# Patient Record
Sex: FEMALE | Race: ASIAN | NOT HISPANIC OR LATINO | Employment: STUDENT | URBAN - METROPOLITAN AREA
[De-identification: names, ages, dates, MRNs, and addresses within clinical notes are randomized per-mention and may not be internally consistent; named-entity substitution may affect disease eponyms.]

---
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
## 2022-01-09 ENCOUNTER — TELEPHONE (OUTPATIENT)
Dept: FAMILY MEDICINE | Facility: CLINIC | Age: 22
End: 2022-01-09

## 2022-01-10 NOTE — TELEPHONE ENCOUNTER
Left message #1 at [phone]. Postponing task out to a week and will try again. If patient returns call back, please help patient schedule an appointment per message below. Thanks!

## 2022-01-10 NOTE — TELEPHONE ENCOUNTER
She needs an appt to follow up on her depression and sleep.  I'm not sure if Dr. Rader is her PCP?  Or me?    Virtual visit OK

## 2022-01-17 NOTE — TELEPHONE ENCOUNTER
Left message #2 at [phone]. Sending letter out and postponing task out to 2 weeks and will try again if an appointment hasn't been made. If patient returns call back, please help patient schedule an appointment per message below. Thanks!

## 2022-02-04 NOTE — TELEPHONE ENCOUNTER
Left message #3 at [phone]. If patient returns call back, please help patient schedule an appointment per message below. Thanks! We have made several attempts to contact patient by phone and letter to schedule an appointment. Unfortunately, our calls have not been returned and we were unable to schedule. At this time, we will no longer make an attempt to schedule this appointment. Completing task.

## 2022-02-11 ENCOUNTER — OFFICE VISIT (OUTPATIENT)
Dept: FAMILY MEDICINE | Facility: CLINIC | Age: 22
End: 2022-02-11
Payer: COMMERCIAL

## 2022-02-11 VITALS
OXYGEN SATURATION: 98 % | HEART RATE: 94 BPM | TEMPERATURE: 98.8 F | DIASTOLIC BLOOD PRESSURE: 60 MMHG | RESPIRATION RATE: 20 BRPM | BODY MASS INDEX: 19.63 KG/M2 | HEIGHT: 60 IN | SYSTOLIC BLOOD PRESSURE: 110 MMHG | WEIGHT: 100 LBS

## 2022-02-11 DIAGNOSIS — N92.6 MISSED PERIOD: ICD-10-CM

## 2022-02-11 DIAGNOSIS — Z34.90 PREGNANCY, UNSPECIFIED GESTATIONAL AGE: Primary | ICD-10-CM

## 2022-02-11 LAB — HCG UR QL: POSITIVE

## 2022-02-11 PROCEDURE — 99214 OFFICE O/P EST MOD 30 MIN: CPT | Performed by: FAMILY MEDICINE

## 2022-02-11 PROCEDURE — 81025 URINE PREGNANCY TEST: CPT | Performed by: FAMILY MEDICINE

## 2022-02-11 RX ORDER — ONDANSETRON 4 MG/1
4 TABLET, FILM COATED ORAL EVERY 12 HOURS PRN
Qty: 30 TABLET | Refills: 0 | Status: SHIPPED | OUTPATIENT
Start: 2022-02-11 | End: 2022-03-04

## 2022-02-11 ASSESSMENT — MIFFLIN-ST. JEOR: SCORE: 1145.72

## 2022-02-14 ENCOUNTER — OFFICE VISIT (OUTPATIENT)
Dept: FAMILY MEDICINE | Facility: CLINIC | Age: 22
End: 2022-02-14
Payer: COMMERCIAL

## 2022-02-14 ENCOUNTER — ALLIED HEALTH/NURSE VISIT (OUTPATIENT)
Dept: NURSING | Facility: CLINIC | Age: 22
End: 2022-02-14

## 2022-02-14 VITALS
HEART RATE: 76 BPM | DIASTOLIC BLOOD PRESSURE: 52 MMHG | BODY MASS INDEX: 20.27 KG/M2 | RESPIRATION RATE: 16 BRPM | HEIGHT: 60 IN | SYSTOLIC BLOOD PRESSURE: 96 MMHG | OXYGEN SATURATION: 99 % | TEMPERATURE: 98 F | WEIGHT: 103.25 LBS

## 2022-02-14 DIAGNOSIS — F33.1 MODERATE EPISODE OF RECURRENT MAJOR DEPRESSIVE DISORDER (H): ICD-10-CM

## 2022-02-14 DIAGNOSIS — Z65.8 PSYCHOSOCIAL STRESSORS: ICD-10-CM

## 2022-02-14 DIAGNOSIS — Z60.3 IMPAIRED ABILITY TO USE COMMUNITY RESOURCES DUE TO LANGUAGE BARRIER: ICD-10-CM

## 2022-02-14 DIAGNOSIS — Z64.0 UNWANTED PREGNANCY WITH PLANS FOR TERMINATION: ICD-10-CM

## 2022-02-14 DIAGNOSIS — Z71.89 COMPLEX CARE COORDINATION: Primary | ICD-10-CM

## 2022-02-14 DIAGNOSIS — Z64.0 UNWANTED PREGNANCY WITH PLANS FOR TERMINATION: Primary | ICD-10-CM

## 2022-02-14 DIAGNOSIS — Z71.89 COMPLEX CARE COORDINATION: ICD-10-CM

## 2022-02-14 DIAGNOSIS — Z34.90 PREGNANCY, UNSPECIFIED GESTATIONAL AGE: Primary | ICD-10-CM

## 2022-02-14 PROCEDURE — 99207 PR NO CHARGE LOS: CPT | Performed by: FAMILY MEDICINE

## 2022-02-14 PROCEDURE — 99213 OFFICE O/P EST LOW 20 MIN: CPT | Performed by: FAMILY MEDICINE

## 2022-02-14 SDOH — ECONOMIC STABILITY: TRANSPORTATION INSECURITY
IN THE PAST 12 MONTHS, HAS LACK OF TRANSPORTATION KEPT YOU FROM MEETINGS, WORK, OR FROM GETTING THINGS NEEDED FOR DAILY LIVING?: NO

## 2022-02-14 SDOH — SOCIAL STABILITY - SOCIAL INSECURITY: ACCULTURATION DIFFICULTY: Z60.3

## 2022-02-14 SDOH — ECONOMIC STABILITY: INCOME INSECURITY: IN THE LAST 12 MONTHS, WAS THERE A TIME WHEN YOU WERE NOT ABLE TO PAY THE MORTGAGE OR RENT ON TIME?: NO

## 2022-02-14 SDOH — ECONOMIC STABILITY: TRANSPORTATION INSECURITY
IN THE PAST 12 MONTHS, HAS THE LACK OF TRANSPORTATION KEPT YOU FROM MEDICAL APPOINTMENTS OR FROM GETTING MEDICATIONS?: NO

## 2022-02-14 ASSESSMENT — SOCIAL DETERMINANTS OF HEALTH (SDOH)
ARE YOU MARRIED, WIDOWED, DIVORCED, SEPARATED, NEVER MARRIED, OR LIVING WITH A PARTNER?: LIVING WITH PARTNER
WITHIN THE LAST YEAR, HAVE YOU BEEN AFRAID OF YOUR PARTNER OR EX-PARTNER?: NO
DO YOU BELONG TO ANY CLUBS OR ORGANIZATIONS SUCH AS CHURCH GROUPS UNIONS, FRATERNAL OR ATHLETIC GROUPS, OR SCHOOL GROUPS?: NO
IN A TYPICAL WEEK, HOW MANY TIMES DO YOU TALK ON THE PHONE WITH FAMILY, FRIENDS, OR NEIGHBORS?: MORE THAN THREE TIMES A WEEK
HOW OFTEN DO YOU ATTEND CHURCH OR RELIGIOUS SERVICES?: NEVER
HOW OFTEN DO YOU GET TOGETHER WITH FRIENDS OR RELATIVES?: MORE THAN THREE TIMES A WEEK
HOW HARD IS IT FOR YOU TO PAY FOR THE VERY BASICS LIKE FOOD, HOUSING, MEDICAL CARE, AND HEATING?: SOMEWHAT HARD
WITHIN THE LAST YEAR, HAVE YOU BEEN HUMILIATED OR EMOTIONALLY ABUSED IN OTHER WAYS BY YOUR PARTNER OR EX-PARTNER?: NO
HOW OFTEN DO YOU ATTENT MEETINGS OF THE CLUB OR ORGANIZATION YOU BELONG TO?: NEVER
WITHIN THE LAST YEAR, HAVE YOU BEEN KICKED, HIT, SLAPPED, OR OTHERWISE PHYSICALLY HURT BY YOUR PARTNER OR EX-PARTNER?: NO
WITHIN THE LAST YEAR, HAVE TO BEEN RAPED OR FORCED TO HAVE ANY KIND OF SEXUAL ACTIVITY BY YOUR PARTNER OR EX-PARTNER?: NO

## 2022-02-14 ASSESSMENT — LIFESTYLE VARIABLES
HOW OFTEN DO YOU HAVE SIX OR MORE DRINKS ON ONE OCCASION: NEVER
HOW OFTEN DO YOU HAVE A DRINK CONTAINING ALCOHOL: NEVER

## 2022-02-14 ASSESSMENT — ACTIVITIES OF DAILY LIVING (ADL): DEPENDENT_IADLS:: INDEPENDENT

## 2022-02-14 ASSESSMENT — MIFFLIN-ST. JEOR: SCORE: 1160.48

## 2022-02-14 NOTE — PROGRESS NOTES
"SUBJECTIVE  Smu Paw is a 22 year old female here for:    Chief Complaint   Patient presents with     Follow Up     Pregnancy      Seen 3 days ago for pregnancy confirmation  First pregnancy  LMP unsure, but sometime in November  Unplanned pregnancy and she is considering termination  She discussed over the weekend with her boyfriend  Her sister does want her to continue the pregnancy, but she told her \"it is your body and your decision\"  Patient does not feel ready for a pregnancy at this time and desires termination  Concerned about waiting- she would like to do the termination as soon as possible because \"the longer I stay pregnant, the harder it is\"  Her family does not get along with her boyfriend- her mom does not approve of her relationship with her boyfriend  Patient is accompanied by her boyfriend, Art BLAKE  See HPI    Reviewed Past Medical History, Medications, Family History and Social History in Epic and up to date with no new changes.    OBJECTIVE  BP 96/52 (BP Location: Left arm, Patient Position: Sitting, Cuff Size: Adult Regular)   Pulse 76   Temp 98  F (36.7  C) (Oral)   Resp 16   Ht 1.525 m (5' 0.04\")   Wt 46.8 kg (103 lb 4 oz)   LMP 01/17/2022 (Approximate)   SpO2 99%   BMI 20.14 kg/m       General: Cooperative, pleasant, in no acute distress    ASSESSMENT/PLAN:   Smu was seen today for follow up.    Diagnoses and all orders for this visit:    Pregnancy, unspecified gestational age   Unwanted pregnancy with plans for termination: Discussed with patient with her boyfriend present in the room, as well as without him in the room. Patient does not feel ready for a pregnancy and is interested in termination resources. Unsure LMP- but maybe in November, placing her around 8 weeks gestation. She denies any coercion/safety issues with her relationship. She and her boyfriend are requesting assistance with navigating termination resources due to multiple barriers to care, including language " barrier. Care coordination referral DORA Interiano, assisted with [facility] with patient and her boyfriend- see note for details.   -     Care Coordination Referral    Complex care coordination  -     Care Coordination Referral    Impaired ability to use community resources due to language barrier  -     Care Coordination Referral    Psychosocial stressors  -     Care Coordination Referral    25 minutes spent on the date of the encounter doing chart review, history and exam, documentation and further activities as noted above      Options for treatment and follow-up care were reviewed with the patient. Smu Paw and/or guardian was engaged and actively involved in the decision making process. Smu Paw and/or guardian verbalized understanding of the options discussed and was satisfied with the final plan.      Michelle Blake MD

## 2022-02-14 NOTE — LETTER
[facility] CARE COORDINATION  [address]  [place] [address]  February 14, 2022    Smu Paw  [address]  [place] [address]      Dear Param,    I am a clinic care coordinator who works with Adrian Rader MD at the [facility]. I wanted to thank you for spending the time to talk with me.  Below is a description of clinic care coordination and how I can further assist you.      The clinic care coordination team is made up of a registered nurse,  and community health worker who understand the health care system. The goal of clinic care coordination is to help you manage your health and improve access to the health care system in the most efficient manner. The team can assist you in meeting your health care goals by providing education, coordinating services, strengthening the communication among your providers and supporting you with any resource needs.    Please feel free to contact the Community Health Worker at [phone] with any questions or concerns. We are focused on providing you with the highest-quality healthcare experience possible and that all starts with you.     Sincerely,     DWIGHT Roche, LSW  Social Work Care Coordinator  [facility]    Enclosed: I have enclosed a copy of the Patient Centered Plan of Care. This has helpful information and goals that we have talked about. Please keep this in an easy to access place to use as needed.

## 2022-02-14 NOTE — PROGRESS NOTES
Clinic Care Coordination Contact    Visit Note:     Present for today's assessment is pt, pt's boyfriend (Art Jaramillo), and CCC SW.     Param Polanco is a 21yo female who is approximately 8 weeks pregnant. She was referred for an initial CCC assessment and pregnancy resources consult. This is her first pregnancy, and she would like to make plans for termination.    1. Complex care coordination, impaired ability to use community resources due to language barrier  CCC assessment completed by SW today. Pt will be enrolled and assigned a CHW.     2. Psychosocial stressors, moderate episode of recurrent major depressive disorder  She is tearful throughout our call/conversation with [facility] today. Mixed emotions about a variety of factors, and shares that she is anxious about the thought of a procedural  vs pill form. Encouraged her to ask questions at her initial phone visit with the [facility] doctor tomorrow and will have Dr. Blake f/u with her by phone at some point this week for additional support.     3. Unwanted pregnancy with plans for termination  Approximately 8 weeks along. Boyfriend/FOB Art Jaramillo is involved and supportive. She does wish to pursue plans for termination. [facility] today. Brief medical history provided, options were reviewed. Took notes which were generated into letters tab and printed for her to take home. Several appointments scheduled for her to follow up with them to move forward with terminating the pregnancy. It was noted that she will not be eligible for the pill form of termination on the appointment timeline laid out below. Unfortunately this is the soonest available [facility] can get her in.     2022 at 9:30am - initial phone call with doctor    2022 at 11:30am - pre-visit phone call with doctor     2022 at 12:15pm - termination appointment in-person at [facility]     Plan:  1.) See goals.  2.) Begin scheduled outreach.    Referral Information:  Referral Source: PCP  Primary Diagnosis: Psychosocial    Chief Complaint   Patient presents with     Clinic Care Coordination - Initial     Clinic Care Coordination - Face To Face      Universal Utilization:   Clinic Utilization  Difficulty keeping appointments:: No  Compliance Concerns: No  No-Show Concerns: No  No PCP office visit in Past Year: No  Utilization    Hospital Admissions  0             ED Visits  0             No Show Count (past year)  3                Current as of: 2/14/2022  1:39 AM            Clinical Concerns:  Current Medical Concerns: See Problem List  Current Behavioral Concerns: See Problem List    Patient Active Problem List   Diagnosis     Irregular menses     Moderate episode of recurrent major depressive disorder (H)     Insomnia, unspecified type     Other fatigue     Education Provided to patient: Role of CCC   Pain  Pain (GOAL):: No  Health Maintenance Reviewed: Due/Overdue  Clinical Pathway: None    Medication Management:  Medication review status: Medications reviewed and no changes reported per patient.           Functional Status:  Dependent ADLs:: Independent  Dependent IADLs:: Independent  Bed or wheelchair confined:: No  Mobility Status: Independent  Fallen 2 or more times in the past year?: No  Any fall with injury in the past year?: No    Living Situation:  Current living arrangement:: I live in a private home with family  Type of residence:: Private home - no stairs    Social Determinants of Health     Tobacco Use: High Risk     Smoking Tobacco Use: Never Smoker     Smokeless Tobacco Use: Current User   Alcohol Use: Not At Risk     Frequency of Alcohol Consumption: Never     Average Number of Drinks: Not on file     Frequency of Binge Drinking: Never   Financial Resource Strain: Medium Risk     Difficulty of Paying Living Expenses: Somewhat hard   Food Insecurity: No Food  Insecurity     Worried About Running Out of Food in the Last Year: Never true     Ran Out of Food in the Last Year: Never true   Transportation Needs: No Transportation Needs     Lack of Transportation (Medical): No     Lack of Transportation (Non-Medical): No   Physical Activity: Insufficiently Active     Days of Exercise per Week: 1 day     Minutes of Exercise per Session: 10 min   Stress: Stress Concern Present     Feeling of Stress : To some extent   Social Connections: Moderately Isolated     Frequency of Communication with Friends and Family: More than three times a week     Frequency of Social Gatherings with Friends and Family: More than three times a week     Attends Zoroastrian Services: Never     Active Member of Clubs or Organizations: No     Attends Club or Organization Meetings: Never     Marital Status: Living with partner   Intimate Partner Violence: Not At Risk     Fear of Current or Ex-Partner: No     Emotionally Abused: No     Physically Abused: No     Sexually Abused: No   Depression: At risk     PHQ-2 Score: 6   Housing Stability: Low Risk      Unable to Pay for Housing in the Last Year: No     Number of Places Lived in the Last Year: 2     Unstable Housing in the Last Year: No     Lifestyle & Psychosocial Needs:  Diet:: Regular  Inadequate nutrition (GOAL):: No  Tube Feeding: No  Inadequate activity/exercise (GOAL):: No  Significant changes in sleep pattern (GOAL): No  Transportation means:: Medical transport,Regular car,Other  Zoroastrian or spiritual beliefs that impact treatment:: No  Mental health DX:: No  Mental health management concern (GOAL):: No  Chemical Dependency Status: No Current Concerns  Informal Support system:: Significant other,Family,Friends      Resources and Interventions:  Community Resources: Transportation Services,Financial/Insurance  Supplies used at home:: None  Equipment Currently Used at Home: none  Employment Status: unemployed    Advance Care  Plan/Directive:  Advanced Care Plans/Directives on file:: No  Advanced Care Plan/Directive Status: Declined Further Information    Referrals Placed: Specialty Providers ([facility])     Outreach Frequency: monthly    Goals        1. [facility] (pt-stated)       Goal statement: I will attend my upcoming appointments with [facility] as scheduled.      Date goal set: 2/14/22  Barriers: English as a second language  Strengths: Willing to accept support  Date to achieve by: 4/14/22  Patient expressed understanding of goal: Yes     Action steps to achieve this goal:  1.  I will answer the phone on 2/15/22 at 9:30am when [facility] calls for my initial phone visit.  2.  I will answer the phone on 3/4/22 at 11:30am when [facility] calls for my pre-visit phone call.  3.  I will attend my in-person appointment with [facility] on 3/7/22 at 12:15pm.  4.  I will communicate with CHW at outreach and request assistance with appointment/transportation coordination as needed.   5.  I will reach out to [facility] at [phone] with questions or concerns.    [facility]  Phone: [phone]  [address] [place] [address]         2. [facility] (pt-stated)       Goal statement: I will attend all scheduled follow up appointments with Dr. Blake throughout the next 60 days.     Date goal set: 2/14/22  Barriers: English as a second language  Strengths: Willing to accept support  Date to achieve by: 4/14/22  Patient expressed understanding of goal: Yes     Action steps to achieve this goal:  1.  I will attend my next f/u appointment with Dr. Blake.  2.  I will communicate with CHW at outreach and request assistance with appointment/transportation coordination as needed.   3.  I will reach out to [facility] at [phone] with questions or concerns.

## 2022-02-14 NOTE — LETTER
[facility]  Patient Centered Plan of Care  About Me:        Patient Name:  Param Polanco    YOB: 2000  Age:         22 year old   Margy MRN:    [id number] Telephone Information:  Home Phone [phone]   Mobile [phone]       Address:  [address]  [place] [address] Email address:  No e-mail address on record      Emergency Contact(s)    Name Relationship Lgl Grd Work Phone Home Phone Mobile Phone   1. LIS LLANES Mother   [phone]    2. OSMAN FOSTER Father   [phone]            Primary language:  Sara     needed? Yes   [facility]:  [phone] op. 1  Other communication barriers:English as a second language; Lack of coping    Preferred Method of Communication:     Current living arrangement: I live in a private home with family    Mobility Status/ Medical Equipment: Independent        Health Maintenance  Health Maintenance Reviewed: Due/Overdue      My Access Plan  Medical Emergency 911   Primary Clinic Line [facility] [phone]   24 Hour Appointment Line [phone] or  [phone]) (toll-free)   24 Hour Nurse Line [phone] (toll-free)   Preferred Urgent Care [facility] [phone]     [facility]  [phone]     Preferred Pharmacy [facility] - [place], [place] - [address]     Behavioral Health Crisis Line The National Suicide Prevention Lifeline at 1-928.157.7319 or 911             My Care Team Members  Patient Care Team       Relationship Specialty Notifications Start End    Adrian Rader MD PCP - General Family Practice  9/28/16     Phone: [phone] Fax: [phone]         [address] [place] [address]    Suma Fleming PA-C Assigned PCP   10/14/21     Phone: [phone] Fax: [phone]         [address] [place] [address]    Simran Silvestre LSW Lead Care Coordinator Social  Worker - Clinical Admissions 2/14/22             My Care Plans  Self Management and Treatment Plan  Goals and (Comments)  Goals        General     1. [facility] ([facility])      Notes - Note edited  2/14/2022  1:32 PM by Simran Silvestre LSW     Goal statement: I will attend my upcoming appointments with [facility] as scheduled.      Date goal set: 2/14/22  Barriers: English as a second language  Strengths: Willing to accept support  Date to achieve by: 4/14/22  Patient expressed understanding of goal: Yes     Action steps to achieve this goal:  1.  I will answer the phone on 2/15/22 at 9:30am when [facility] calls for my initial phone visit.  2.  I will answer the phone on 3/4/22 at 11:30am when [facility] calls for my pre-visit phone call.  3.  I will attend my in-person appointment with [facility] on 3/7/22 at 12:15pm.  4.  I will communicate with CHW at outreach and request assistance with appointment/transportation coordination as needed.   5.  I will reach out to  Simran at [phone] with questions or concerns.    [facility]  Phone: [phone]  [address] [place] [address]       2. [facility] (pt-stated)      Notes - Note edited  2/14/2022  1:32 PM by Simran Silvestre LSW     Goal statement: I will attend all scheduled follow up appointments with Dr. Blake throughout the next 60 days.     Date goal set: 2/14/22  Barriers: English as a second language  Strengths: Willing to accept support  Date to achieve by: 4/14/22  Patient expressed understanding of goal: Yes     Action steps to achieve this goal:  1.  I will attend my next f/u appointment with Dr. Blake.  2.  I will communicate with CHW at outreach and request assistance with appointment/transportation coordination as needed.   3.  I will reach out to DORA Khalil at [phone] with questions or concerns.             Action Plans on File:                       Advance Care  Plans/Directives Type:   No data recorded    My Medical and Care Information  Problem List   Patient Active Problem List   Diagnosis     Irregular menses     Moderate episode of recurrent major depressive disorder (H)     Insomnia, unspecified type     Other fatigue      Current Medications and Allergies:  See printed Medication Report.    Care Coordination Start Date: 2/14/2022   Frequency of Care Coordination: monthly     Form Last Updated: 02/14/2022

## 2022-02-14 NOTE — LETTER
Appointment information:    Tuesday February 15th, 2022 at 9:30am - initial phone call with doctor    Friday March 4th, 2022 at 11:30am - pre-visit phone call with doctor     Monday March 7th, 2022 at 12:15pm - termination appointment in-person at [facility] location    [facility] information[facility]     [facility]  [address] [place] [address]    Phone: [phone]    Day of appointment instructions...    - Eat a light meal before going to your appointment  - Drink lots of water 1-2 days before your procedure  - Do not wear lotion, perfume, makeup, or jewelry to your appointment  - Bring your photo ID and insurance card  - Wear a mask  - Park in one of the open patient parking spots    Other helpful information to remember...    - Insurance will cover the cost of your visit and procedure regardless of the method you choose    - Your visit at the [facility] center will last about 4-6 hours, plan to be there for the entire day and do not make other plans or appointments    -  at [facility] is DWIGHT Roche, LSW. You can reach Simran via phone call or text at [phone] if you need anything or have questions about this process at any time. Simran works on Monday, Wednesday, and Friday. Leave a message if she doesn't answer and she will get back to you when able!

## 2022-02-15 ENCOUNTER — NURSE TRIAGE (OUTPATIENT)
Dept: NURSING | Facility: CLINIC | Age: 22
End: 2022-02-15
Payer: COMMERCIAL

## 2022-02-15 NOTE — TELEPHONE ENCOUNTER
Patient called to speak with PCP regarding  questions she has. Nurse unable to answer the questions and patient would like a call back from her PCP.     Writer routing message to PCP to address patient's questions.     Soo Carlson RN, [facility]  2/15/2022 12:45pm      Reason for Disposition    [1] Caller has NON-URGENT pregnancy question AND [2] triager unable to answer question    Protocols used: PREGNANCY [id number]

## 2022-02-18 NOTE — TELEPHONE ENCOUNTER
Rn call to patient with  ([phone])  -patient reports ordering pills for medical  through [email or web address]  -expecting medication to arrive 22  -RN walked through process/what to expect:    [email or web address]/[email or web address]--[email or web address]     Encouraged her to call [facility] at any time with questions or concerns [phone]    [facility] -RN advised keeping appointment for the time being -in case medications don't arrive/work    RN will call patient to check in at [phone] number on 22. Patient states any time that day at that number is good.     Follow-up call will include scheduling a lab pregnancy test/office visit with provider to confirm  success and discuss birthcontrol options.     RN spoke to provider and relayed this information.     Routing to provider for FYI and to Nurse Morton for follow-up    Carolyn Cantu RN on 2022 at 5:10 PM

## 2022-02-22 NOTE — TELEPHONE ENCOUNTER
"RN call to patient (with ) * was kept throughout call but patient tried to communicate directly with RN in english    Medication arrived as scheduled    Took first dose Saturday   -began having fever and chills    Second dose Sunday    *patient not clear on when bleeding started    Reports some heavy bleeding, cramping and passing of large clots of tissue  -still having bleeding but improving   - 2/22/22 less than 2/23/22 *both bleeding and cramping  -unable to tell RN how often pads are being changed, as she does not have access to menstrual hygiene products     Patient states that she is no longer living with sister -as sister \"hates her and is jealous of her\"  -now living with mother who knew of the pregnancy, not the termination    Reports mood is 'sort of depressed\" as boyfriend broke up with her.   RN inquired about suicidal ideation -patient states that sometimes she thinks she might not want to be alive/should she be alive  -patient denies any plans or actual thoughts of killing herself  -advised that current situation is temporary and emotions are running high, she has been through a lot of change and some sadness is normal and to be expected. Patient agrees    Patient is agreeable to visit with provider to follow-up and discuss birth control options  -RN scheduled and requested transportation    RN offered follow-up call later this week and patient states that would be helpful    -patient asked 2 questions: should she [facility] her [facility] (okay to [facility])  And can she shower/activity restrictions: no restrictions, patient can do whatever she feels up to doing.    RN will call patient again 2/24/22 to check in as planned with patient     Encouraged patient to call with any questions or concerns, patient confirms phone number for [facility]      "

## 2022-02-24 NOTE — TELEPHONE ENCOUNTER
"RN call to patient with     Spoke with patient at length   -bleeding has improved  -now like a period  -intermittent cramping (\"pain near bladder\")      How are you feeling today    Good.   Yes,     Not sleeping well  -thinking too much  -    "

## 2022-02-25 ENCOUNTER — PATIENT OUTREACH (OUTPATIENT)
Dept: CARE COORDINATION | Facility: CLINIC | Age: 22
End: 2022-02-25
Payer: COMMERCIAL

## 2022-02-25 DIAGNOSIS — Z60.3 IMPAIRED ABILITY TO USE COMMUNITY RESOURCES DUE TO LANGUAGE BARRIER: ICD-10-CM

## 2022-02-25 DIAGNOSIS — Z71.89 COMPLEX CARE COORDINATION: Primary | ICD-10-CM

## 2022-02-25 DIAGNOSIS — Z65.8 PSYCHOSOCIAL STRESSORS: ICD-10-CM

## 2022-02-25 SDOH — SOCIAL STABILITY - SOCIAL INSECURITY: ACCULTURATION DIFFICULTY: Z60.3

## 2022-02-25 NOTE — PROGRESS NOTES
Clinic Care Coordination Contact     Care Team Conversation - Provider to Provider     Discussed pt/pt's case with clinic RN Carolyn Cantu. Carolyn spoke with pt by phone several times this week to get an update on how she is doing post medical . Pt ordered medication through [email or web address]. Took first dose on 22 and second dose on 22. See nurse triage encounter dated 2/15/22 for detailed notes summarizing phone conversations.     There are a variety of psychosocial stressors/concerns that were identified while talking with Carolyn this week. Smu is coming to f/u with Dr. Blake in-person next Friday 3/4/21 at 10:40am. Current plan is to have her f/u with me same day following her visit with Dr. Blake.     Plan:  1.) See updated goals.  2.) Continue scheduled outreach.    Goals        [facility]. [facility])       Goal statement: I will attend my upcoming [facility] as scheduled.      Date goal set: 22  Barriers: English as a second language  Strengths: Willing to accept support  Date to achieve by: 22  Patient expressed understanding of goal: Yes     Action steps to achieve this goal:  1.  I will answer the phone on 2/15/22 at 9:30am when [facility] calls for my initial phone visit.  2.  I will answer the phone on 3/4/22 at 11:30am when [facility] calls for my pre-visit phone call.  3.  I will attend my in-person appointment with [facility] on 3/7/22 at 12:15pm.  4.  I will communicate with CHW at outreach and request assistance with appointment/transportation coordination as needed.   5.  I will reach out to DORA Khalil at [phone] with questions or concerns.    [facility]  Phone: [phone]  [address] [place] [id number]         2. [facility] (pt-stated)       Goal statement: I will attend all scheduled follow up appointments with Dr. Blake throughout the next 60 days.     Date goal set: 22  Barriers:  English as a second language  Strengths: Willing to accept support  Date to achieve by: 4/14/22  Patient expressed understanding of goal: Yes     Action steps to achieve this goal:  1.  I will attend my next f/u appointment with Dr. Blake on 3/4/22 at 10:40am and see DORA Khalil the same day.  2.  I will communicate with CHW at outreach and request assistance with appointment/transportation coordination as needed.   3.  I will reach out to DORA Khaill at [phone] with questions or concerns.

## 2022-03-04 ENCOUNTER — OFFICE VISIT (OUTPATIENT)
Dept: FAMILY MEDICINE | Facility: CLINIC | Age: 22
End: 2022-03-04
Payer: COMMERCIAL

## 2022-03-04 VITALS
HEIGHT: 60 IN | DIASTOLIC BLOOD PRESSURE: 58 MMHG | RESPIRATION RATE: 16 BRPM | TEMPERATURE: 97.8 F | SYSTOLIC BLOOD PRESSURE: 96 MMHG | HEART RATE: 76 BPM | WEIGHT: 102 LBS | OXYGEN SATURATION: 99 % | BODY MASS INDEX: 20.03 KG/M2

## 2022-03-04 DIAGNOSIS — F41.9 ANXIETY: ICD-10-CM

## 2022-03-04 DIAGNOSIS — R30.0 DYSURIA: ICD-10-CM

## 2022-03-04 DIAGNOSIS — Z30.42 DEPO-PROVERA CONTRACEPTIVE STATUS: ICD-10-CM

## 2022-03-04 DIAGNOSIS — Z98.890 HISTORY OF ELECTIVE ABORTION: ICD-10-CM

## 2022-03-04 DIAGNOSIS — F33.1 MODERATE EPISODE OF RECURRENT MAJOR DEPRESSIVE DISORDER (H): ICD-10-CM

## 2022-03-04 DIAGNOSIS — G47.00 INSOMNIA, UNSPECIFIED TYPE: Primary | ICD-10-CM

## 2022-03-04 DIAGNOSIS — Z11.3 SCREEN FOR STD (SEXUALLY TRANSMITTED DISEASE): ICD-10-CM

## 2022-03-04 LAB
ALBUMIN UR-MCNC: NEGATIVE MG/DL
APPEARANCE UR: CLEAR
BACTERIA #/AREA URNS HPF: ABNORMAL /HPF
BILIRUB UR QL STRIP: NEGATIVE
COLOR UR AUTO: YELLOW
GLUCOSE UR STRIP-MCNC: NEGATIVE MG/DL
HCG SERPL-ACNC: 297 MLU/ML (ref 0–4)
HGB BLD-MCNC: 13.2 G/DL (ref 11.7–15.7)
HGB UR QL STRIP: ABNORMAL
KETONES UR STRIP-MCNC: NEGATIVE MG/DL
LEUKOCYTE ESTERASE UR QL STRIP: ABNORMAL
NITRATE UR QL: NEGATIVE
PH UR STRIP: 6 [PH] (ref 5–7)
RBC #/AREA URNS AUTO: ABNORMAL /HPF
SP GR UR STRIP: <=1.005 (ref 1–1.03)
SQUAMOUS #/AREA URNS AUTO: ABNORMAL /LPF
UROBILINOGEN UR STRIP-ACNC: 0.2 E.U./DL
WBC #/AREA URNS AUTO: ABNORMAL /HPF

## 2022-03-04 PROCEDURE — 96372 THER/PROPH/DIAG INJ SC/IM: CPT | Performed by: FAMILY MEDICINE

## 2022-03-04 PROCEDURE — 87086 URINE CULTURE/COLONY COUNT: CPT | Performed by: FAMILY MEDICINE

## 2022-03-04 PROCEDURE — 87591 N.GONORRHOEAE DNA AMP PROB: CPT | Performed by: FAMILY MEDICINE

## 2022-03-04 PROCEDURE — 81001 URINALYSIS AUTO W/SCOPE: CPT | Performed by: FAMILY MEDICINE

## 2022-03-04 PROCEDURE — 85018 HEMOGLOBIN: CPT | Performed by: FAMILY MEDICINE

## 2022-03-04 PROCEDURE — 87491 CHLMYD TRACH DNA AMP PROBE: CPT | Performed by: FAMILY MEDICINE

## 2022-03-04 PROCEDURE — 84702 CHORIONIC GONADOTROPIN TEST: CPT | Performed by: FAMILY MEDICINE

## 2022-03-04 PROCEDURE — 36415 COLL VENOUS BLD VENIPUNCTURE: CPT | Performed by: FAMILY MEDICINE

## 2022-03-04 PROCEDURE — 87186 SC STD MICRODIL/AGAR DIL: CPT | Performed by: FAMILY MEDICINE

## 2022-03-04 PROCEDURE — 99214 OFFICE O/P EST MOD 30 MIN: CPT | Mod: 25 | Performed by: FAMILY MEDICINE

## 2022-03-04 RX ORDER — MEDROXYPROGESTERONE ACETATE 150 MG/ML
150 INJECTION, SUSPENSION INTRAMUSCULAR
Status: DISCONTINUED | OUTPATIENT
Start: 2022-03-04 | End: 2022-11-04

## 2022-03-04 RX ADMIN — MEDROXYPROGESTERONE ACETATE 150 MG: 150 INJECTION, SUSPENSION INTRAMUSCULAR at 11:23

## 2022-03-04 NOTE — PROGRESS NOTES
"SUBJECTIVE  Smu Collin is a 22 year old female here for:    Chief Complaint   Patient presents with     Follow Up     pregnancy termination and discuss birthcontrol option      Cannot remember exact day that she took pill for pregnancy termination  She took two pills on two separate days  ?maybe 2/20/22  She had about 4 days of bleeding- heavier bleeding the two middle days with large blood clots  Bleeding has since resolved   Denies any abdominal pain, fever/chills  She does report some dysuria about 2 days ago- feels like UTI- she had these previously    History of depression  Previously prescribed lexapro 10 mg daily but she reports she never tried this  She did therapy in high school and did find this useful- interested in trying therapy again  Worries a lot- has difficulty with sleep initiation for at least 2 years  Did not sleep much at all last night  Feels her mind is worrying about things  Once she falls asleep, she stays asleep  Reports she has thoughts about not wanting to be alive, but denies any plan or intent.   Living with her mother  Boyfriend- they broke up after termination but since then, he called her and they are back together- she denies any safety issues or abuse. Her mother does not approve of her relationship with him, so she has to hide this from her mother.    She is interested in birth control  Wants to try to gain weight    ROS  See HPI    Reviewed Past Medical History, Medications, Family History and Social History in Epic and up to date with no new changes.    OBJECTIVE  BP 96/58   Pulse 76   Temp 97.8  F (36.6  C) (Oral)   Resp 16   Ht 1.525 m (5' 0.04\")   Wt 46.3 kg (102 lb)   SpO2 99%   BMI 19.89 kg/m       General: Cooperative, pleasant, in no acute distress      LABS & IMAGES   Results for orders placed or performed in visit on 03/04/22   Hemoglobin     Status: Normal   Result Value Ref Range    Hemoglobin 13.2 11.7 - 15.7 g/dL   HCG quantitative pregnancy     Status: Abnormal "   Result Value Ref Range    hCG Quantitative 297 (H) 0 - 4 mlU/mL   UA Macro with Reflex to Micro and Culture - lab collect     Status: Abnormal    Specimen: Urine, NOS   Result Value Ref Range    Color Urine Yellow Colorless, Straw, Light Yellow, Yellow    Appearance Urine Clear Clear    Glucose Urine Negative Negative mg/dL    Bilirubin Urine Negative Negative    Ketones Urine Negative Negative mg/dL    Specific Gravity Urine <=1.005 1.005 - 1.030    Blood Urine Small (A) Negative    pH Urine 6.0 5.0 - 7.0    Protein Albumin Urine Negative Negative mg/dL    Urobilinogen Urine 0.2 0.2, 1.0 E.U./dL    Nitrite Urine Negative Negative    Leukocyte Esterase Urine Large (A) Negative   Urine Microscopic Exam     Status: Abnormal   Result Value Ref Range    Bacteria Urine Moderate (A) None Seen /HPF    RBC Urine 0-2 0-2 /HPF /HPF    WBC Urine 25-50 (A) 0-5 /HPF /HPF    Squamous Epithelials Urine Moderate (A) None Seen /LPF       ASSESSMENT/PLAN:   Smu was seen today for follow up.    Diagnoses and all orders for this visit:    Insomnia, unspecified type: Chronic issue- reviewed sleep hygiene. She reportedly tried two medications during high school but cannot recall name. Will trial unisom. I also reviewed connection between insomnia and stress/anxiety- she does report racing thoughts often make it difficult for her to fall alseep.   -     doxylamine (UNISOM) 25 MG TABS tablet; Take 0.5-1 tablets (12.5-25 mg) by mouth nightly as needed for sleep    Moderate episode of recurrent major depressive disorder (H)  Anxiety: Previously was prescribed lexapro but patient reporteldy never started this medication. Reviewed options for treatment- patient is amenable to starting therapy- she had a counselor or therapist at her school in high school and found this helpful.     History of elective : Took oral medication through [facility]- per patient history, she had significant bleeding and passing of blood clots x 4 days that  has resolved. Check serum beta HCG and hemoglobin today.  -     HCG quantitative pregnancy; Future  -     Hemoglobin; Future  -     Hemoglobin  -     HCG quantitative pregnancy    Depo-Provera contraceptive status: Did not obtain UPT since it would be expected to be positive in lieu of recent TAB. Depo given. She is considering LARC, such as nexplanon in the future.  -     medroxyPROGESTERone (DEPO-PROVERA) injection 150 mg    Dysuria: 2 day history of dysuria. Check UA/UC.  -     UA Macro with Reflex to Micro and Culture - lab collect; Future  -     UA Macro with Reflex to Micro and Culture - lab collect  -     Urine Microscopic Exam  -     Urine Culture    Screen for STD (sexually transmitted disease): Due to dysuria, screen for STI.  -     Neisseria gonorrhoeae PCR; Future  -     Chlamydia trachomatis PCR; Future  -     Neisseria gonorrhoeae PCR  -     Chlamydia trachomatis PCR        30 minutes spent on the date of the encounter doing chart review, history and exam, documentation and further activities as noted above    Return in about 2 weeks (around 3/18/2022) for insomnia.     Visit was completed along with Sara ascencio    Options for treatment and follow-up care were reviewed with the patient. Smu Paw and/or guardian was engaged and actively involved in the decision making process. Smu Paw and/or guardian verbalized understanding of the options discussed and was satisfied with the final plan.      Michelle Blake MD

## 2022-03-05 LAB
C TRACH DNA SPEC QL NAA+PROBE: NEGATIVE
N GONORRHOEA DNA SPEC QL NAA+PROBE: NEGATIVE

## 2022-03-06 LAB — BACTERIA UR CULT: ABNORMAL

## 2022-03-07 ENCOUNTER — TELEPHONE (OUTPATIENT)
Dept: FAMILY MEDICINE | Facility: CLINIC | Age: 22
End: 2022-03-07
Payer: COMMERCIAL

## 2022-03-07 NOTE — TELEPHONE ENCOUNTER
Left voicemail via phone  for patient to call clinic back at earliest convenience. If patient calls back, please relay provider message below about her UTI and that a prescription was sent in to the pharmacy for her.     Tiny YOST RN      ----- Message from Michelle Blake MD sent at 3/7/2022 11:58 AM CST -----  Attempted calling patient. No answer. Please attempt calling again to let her know    1. Her urine did show a UTI (bladder infection)- I have sent a prescription for antibiotic to her pharmacy- take 1 tablet twice daily for 5 days- to treat. Make sure she drinks a lot of water as well.    2. Her pregnancy hormone was low- this was as we would expect after a pregnancy termination (). I will recheck her blood test in 2 weeks at her clinic appointment.

## 2022-03-08 DIAGNOSIS — F33.1 MODERATE EPISODE OF RECURRENT MAJOR DEPRESSIVE DISORDER (H): Primary | ICD-10-CM

## 2022-03-08 NOTE — TELEPHONE ENCOUNTER
RN call to patient   -left  regarding prescription /UTI  -RN will call again 03/09/2022    Carolyn Cantu RN on 3/8/2022 at 5:49 PM         100

## 2022-03-10 ENCOUNTER — TELEPHONE (OUTPATIENT)
Dept: BEHAVIORAL HEALTH | Facility: CLINIC | Age: 22
End: 2022-03-10
Payer: COMMERCIAL

## 2022-03-10 NOTE — TELEPHONE ENCOUNTER
RN attempted to call patient 3 times. Twice with , once without. Numbers will not go through.     Behavioral health also attempted to call today without luck.    Carolyn Cantu RN on 3/10/2022 at 5:58 PM

## 2022-03-10 NOTE — TELEPHONE ENCOUNTER
Writer tried both the:   house phone: [phone], and  mobile phone: [phone], but both would not go through.    Writer was unable to leave  due to this.

## 2022-03-10 NOTE — TELEPHONE ENCOUNTER
Referring clinic RN attempted call to patient 3x -no answer, no voicemail      Carolyn Cantu RN on 3/10/2022 at 5:56 PM

## 2022-03-14 ENCOUNTER — VIRTUAL VISIT (OUTPATIENT)
Dept: PSYCHOLOGY | Facility: CLINIC | Age: 22
End: 2022-03-14
Payer: COMMERCIAL

## 2022-03-14 DIAGNOSIS — F32.A DEPRESSION, UNSPECIFIED DEPRESSION TYPE: Primary | ICD-10-CM

## 2022-03-14 PROCEDURE — 90834 PSYTX W PT 45 MINUTES: CPT | Mod: 95

## 2022-03-14 ASSESSMENT — ANXIETY QUESTIONNAIRES
6. BECOMING EASILY ANNOYED OR IRRITABLE: SEVERAL DAYS
7. FEELING AFRAID AS IF SOMETHING AWFUL MIGHT HAPPEN: NOT AT ALL
1. FEELING NERVOUS, ANXIOUS, OR ON EDGE: NOT AT ALL
5. BEING SO RESTLESS THAT IT IS HARD TO SIT STILL: SEVERAL DAYS
4. TROUBLE RELAXING: NOT AT ALL
2. NOT BEING ABLE TO STOP OR CONTROL WORRYING: NOT AT ALL
3. WORRYING TOO MUCH ABOUT DIFFERENT THINGS: NEARLY EVERY DAY
GAD7 TOTAL SCORE: 5

## 2022-03-14 ASSESSMENT — PATIENT HEALTH QUESTIONNAIRE - PHQ9: SUM OF ALL RESPONSES TO PHQ QUESTIONS 1-9: 13

## 2022-03-14 NOTE — Clinical Note
Pete Romero,  I am having difficulty with getting the correct DX in the note and for the plan- I got the closest I could find. Will these work?   Thanks,  Abigail

## 2022-03-14 NOTE — PROGRESS NOTES
"    [facility]   Mental Health & Addiction Services     Progress Note - Initial Visit    Patient  Name:  Param Polanco Date: 3/14/2022         Service Type: Individual     Visit Start Time: 10:18 AM  Visit End Time: 11:00 AM    Visit #: 1    Attendees: Client and     Service Modality:  Phone Visit:      Provider verified identity through the following two step process.  Patient provided:  Patient  and Patient address    The patient has been notified of the following:      \"We have found that certain health care needs can be provided without the need for a face to face visit.  This service lets us provide the care you need with a phone conversation.       I will have full access to your [facility] medical record during this entire phone call.   I will be taking notes for your medical record.      Since this is like an office visit, we will bill your insurance company for this service.       There are potential benefits and risks of telephone visits (e.g. limits to patient confidentiality) that differ from in-person visits.?Confidentiality still applies for telephone services, and nobody will record the visit.  It is important to be in a quiet, private space that is free of distractions (including cell phone or other devices) during the visit.??      If during the course of the call I believe a telephone visit is not appropriate, you will not be charged for this service\"     Consent has been obtained for this service by care team member: Yes        DATA:   Interactive Complexity: No   Crisis: No     Presenting Concerns/  Current Stressors:   Param was referred to therapy by her PCP for low mood, increased worrying, trouble falling asleep and thoughts that she would be better off dead. Param agrees with her PCP's assessment and is ambivalent about therapy at this time. She reports 2 past self interrupted suicide attempts and current denies any safety concerns and contracts for safety. "     ASSESSMENT:  Mental Status Assessment:  Appearance:   Telephone, unable to assess   Eye Contact:   Telephone, unable to assess   Psychomotor Behavior: Telephone, unable to assess   Attitude:   Guarded   Orientation:   All  Speech   Rate / Production: Normal/ Responsive   Volume:  Soft   Mood:    Depressed   Affect:    Telephone, unable to assess   Thought Content:  Clear   Thought Form:  Coherent  Logical   Insight:    Fair       Safety Issues and Plan for Safety and Risk Management:     Bath Suicide Severity Rating Scale (Lifetime/Recent)  Bath Suicide Severity Rating (Lifetime/Recent) 3/21/2022   1. Wish to be Dead (Lifetime) 1   Wish to be Dead Description (Lifetime) age 11/12   1. Wish to be Dead (Past 1 Month) 0   2. Non-Specific Active Suicidal Thoughts (Lifetime) 1   2. Non-Specific Active Suicidal Thoughts (Past 1 Month) 0   3. Active Suicidal Ideation with any Methods (Not Plan) Without Intent to Act (Lifetime) 1   Active Suicidal Ideation with any Methods (Not Plan) Description (Lifetime) hang herself   3. Active Suicidal Ideation with any Methods (Not Plan) Without Intent to Act (Past 1 Month) 0   4. Active Suicidal Ideation with Some Intent to Act, Without Specific Plan (Lifetime) 1   Active Suicidal Ideation with Some Intent to Act, Without Specific Plan Description (Lifetime) hang herself   4. Active Suicidal Ideation with Some Intent to Act, Without Specific Plan (Past 1 Month) 0   5. Active Suicidal Ideation with Specific Plan and Intent (Lifetime) 1   Active Suicidal Ideation with Specific Plan and Intent Description (Lifetime) hang herself   5. Active Suicidal Ideation with Specific Plan and Intent (Past 1 Month) 0   Most Severe Ideation Rating (Lifetime) 5   Duration (Lifetime) 4   Controllability (Lifetime) 5   Deterrents (Lifetime) 1   Reasons for Ideation (Lifetime) 3   Actual Attempt (Lifetime) 0   Actual Attempt (Past 3 Months) 0   Has subject engaged in non-suicidal  self-injurious behavior? (Lifetime) 0   Interrupted Attempts (Lifetime) 0   Interrupted Attempts (Past 3 Months) 0   Aborted or Self-Interrupted Attempt (Lifetime) 1   Total Number of Aborted or Self-Interrupted Attempts (Lifetime) 2   Aborted or Self-Interrupted Attempt Description (Lifetime) hang herself   Aborted or Self-Interrupted Attempt (Past 3 Months) 0   Preparatory Acts or Behavior (Lifetime) 1   Total Number of Preparatory Acts (Lifetime) 2   Preparatory Acts or Behavior Description (Lifetime) Gathered rope and cleared out her closet   Preparatory Acts or Behavior (Past 3 Months) 0   Calculated C-SSRS Risk Score (Lifetime/Recent) Moderate Risk     Patient denies current fears or concerns for personal safety.  Patient denies current or recent suicidal ideation or behaviors.  Patient denies current or recent homicidal ideation or behaviors.  Patient denies current or recent self injurious behavior or ideation.  Patient denies other safety concerns.  Recommended that patient call 911 or go to the local ED should there be a change in any of these risk factors.  Patient reports there are no firearms in the house.     Diagnostic Criteria:  Major Depressive Disorder  A) Recurrent episode(s) - symptoms have been present during the same 2-week period and represent a change from previous functioning 5 or more symptoms (required for diagnosis)   - Depressed mood. Note: In children and adolescents, can be irritable mood.     - Significant weight loss when not dieting decrease in appetite.    - Increased sleep.    - Fatigue or loss of energy.    - Feelings of worthlessness or inappropriate and excessive guilt.   B) The symptoms cause clinically significant distress or impairment in social, occupational, or other important areas of functioning  C) The episode is not attributable to the physiological effects of a substance or to another medical condition  D) The occurence of major depressive episode is not better  explained by other thought / psychotic disorders  E) There has never been a manic episode or hypomanic episode      DSM5 Diagnoses: (Sustained by DSM5 Criteria Listed Above)  Diagnoses: 311 (F32.9) Unspecified Depressive Disorder   Psychosocial & Contextual Factors: Covid 19 pandemic and relationship strain with family  WHODAS 2.0 (12 item): No flowsheet data found.  Intervention:  Motivational Interviewing: Client-centered interviewing focusing on assessing stages of change.   CBT: Assess maladaptive patterns of behavior and cognition.   Supportive Therapy: provided active listening, support, validation and encouragement for rapport building.   Collateral Reports Completed:  Not Applicable      PLAN: (Homework, other):  Provider will continue Diagnostic Assessment.   Suicide Risk and Safety Concerns were assessed for [facility].    Patient meets the following risk assessment and triage:   Moderate Risk is identified when the patient has one of the following:  Suicidal behavior more than three months ago ( C-SSRS Suicidal Behavior Lifetime)    The following has been recommended:  Discussion with pt to reduce access to lethal means and therapist provided client with local and national crisis numbers and options.    Safety Plan: Will be created at net session.      RUBEN Marley  March 14, 2022  Note reviewed and clinical supervision by SHENA Jameson, [facility] 3/22/2022

## 2022-03-15 ASSESSMENT — ANXIETY QUESTIONNAIRES: GAD7 TOTAL SCORE: 5

## 2022-03-21 ENCOUNTER — TELEPHONE (OUTPATIENT)
Dept: FAMILY MEDICINE | Facility: CLINIC | Age: 22
End: 2022-03-21
Payer: COMMERCIAL

## 2022-03-21 ASSESSMENT — COLUMBIA-SUICIDE SEVERITY RATING SCALE - C-SSRS
5. HAVE YOU STARTED TO WORK OUT OR WORKED OUT THE DETAILS OF HOW TO KILL YOURSELF? DO YOU INTEND TO CARRY OUT THIS PLAN?: YES
1. HAVE YOU WISHED YOU WERE DEAD OR WISHED YOU COULD GO TO SLEEP AND NOT WAKE UP?: YES
TOTAL  NUMBER OF ABORTED OR SELF INTERRUPTED ATTEMPTS PAST 3 MONTHS: NO
4. HAVE YOU HAD THESE THOUGHTS AND HAD SOME INTENTION OF ACTING ON THEM?: NO
TOTAL  NUMBER OF PREPARATORY ACTS LIFETIME: 2
3. HAVE YOU BEEN THINKING ABOUT HOW YOU MIGHT KILL YOURSELF?: YES
TOTAL  NUMBER OF INTERRUPTED ATTEMPTS LIFETIME: NO
2. HAVE YOU ACTUALLY HAD ANY THOUGHTS OF KILLING YOURSELF?: NO
TOTAL  NUMBER OF INTERRUPTED ATTEMPTS PAST 3 MONTHS: NO
5. HAVE YOU STARTED TO WORK OUT OR WORKED OUT THE DETAILS OF HOW TO KILL YOURSELF? DO YOU INTEND TO CARRY OUT THIS PLAN?: NO
1. IN THE PAST MONTH, HAVE YOU WISHED YOU WERE DEAD OR WISHED YOU COULD GO TO SLEEP AND NOT WAKE UP?: NO
REASONS FOR IDEATION LIFETIME: EQUALLY TO GET ATTENTION, REVENGE, OR A REACTION FROM OTHERS AND TO END/STOP THE PAIN
6. HAVE YOU EVER DONE ANYTHING, STARTED TO DO ANYTHING, OR PREPARED TO DO ANYTHING TO END YOUR LIFE?: YES
TOTAL  NUMBER OF ABORTED OR SELF INTERRUPTED ATTEMPTS LIFETIME: YES
TOTAL  NUMBER OF ABORTED OR SELF INTERRUPTED ATTEMPTS LIFETIME: 2
6. HAVE YOU EVER DONE ANYTHING, STARTED TO DO ANYTHING, OR PREPARED TO DO ANYTHING TO END YOUR LIFE?: NO
2. HAVE YOU ACTUALLY HAD ANY THOUGHTS OF KILLING YOURSELF?: YES
4. HAVE YOU HAD THESE THOUGHTS AND HAD SOME INTENTION OF ACTING ON THEM?: YES
ATTEMPT PAST THREE MONTHS: NO
ATTEMPT LIFETIME: NO

## 2022-03-21 NOTE — TELEPHONE ENCOUNTER
Writer received phone call from patient in regards to rescheduling her missed appt because her ride didn't work out.  When asked what this was about, pt replied it is to discuss previous ongoing issues.  Appt rescheduled to 3/25/2022.  Needs transportation.    Ashia VICENTE RN  [facility]

## 2022-03-22 ENCOUNTER — PATIENT OUTREACH (OUTPATIENT)
Dept: NURSING | Facility: CLINIC | Age: 22
End: 2022-03-22
Payer: COMMERCIAL

## 2022-03-22 NOTE — PROGRESS NOTES
Clinic Care Coordination Contact    Community Health Worker Follow Up    Care Gaps:   Health Maintenance Due   Topic Date Due     DEPRESSION ACTION PLAN  Never done     PCP or clinician to address other care gaps with patient at the next follow up.    Goals:   Goals Addressed as of 3/22/2022 at 10:17 AM                    Today    2/25/22       1. [facility])   30%  20%    Added 2/14/22 by Simran Silvestre LSW      Goal statement: I will attend [facility] as [facility].      Date goal set: 2/14/22  Barriers: English as a second language  Strengths: Willing to accept support  Date to achieve by: 4/14/22  Patient expressed understanding of goal: Yes     Action steps to achieve this goal:  1.  I will answer the phone on 2/15/22 at 9:30am when [facility] calls for my initial phone visit.  2.  I will answer the phone on 3/4/22 at 11:30am when [facility] calls for my pre-visit phone call.  3.  I will attend my [facility] appointment [facility] on 3/7/22 at 12:15pm.  4.  I will communicate with CHW at outreach and request assistance with appointment/transportation coordination as needed.   5.  I will reach out to DORA Khalil at [phone] with questions or concerns.    [facility]  Phone: [phone] [address] [place] [address]    Patient stated she does not know if she needs [facility] and patient to update or discuss with SW at the next follow up visit. CHW called [facility] and left a voice message to call back for coordination.     Goal update: 3/22/2022         2. [facility] (pt-stated)   30%  20%    Added 2/14/22 by Simran Silvestre LSW      Goal statement: I will attend all scheduled follow up appointments with Dr. Blake throughout the next 60 days.     Date goal set: 2/14/22  Barriers: English as a second language  Strengths: Willing to accept support  Date to  achieve by: 4/14/22  Patient expressed understanding of goal: Yes     Action steps to achieve this goal:  1.  I will attend my next f/u appointment with Dr. Blake on 3/4/22 at 10:40am and see DORA Khalil the same day. (Completed).  2.  I will follow up with my doctor again as scheduled on 3/25/2022 at 10:40 AM.   3.  I will communicate with CHW at outreach and request assistance with appointment/transportation coordination as needed.   4.  I will reach out to DORA Khalil at [phone] with questions or concerns.    Goal update: 3/22/2022.        Intervention and Education during outreach:  -CHW reminded patient of coming appointments with PCP, therapy and SW visit at the clinic and transportation arranged with [employer].  -Patient stated she does not know anything about [facility] appointment and if she's scheduled for more follow up. CHW called [facility] and left a voice message to call back to assist patient with coordination.  -Patient stated she's not working nor attending school and living with parents.  -Patient to discuss other resources need with [facility] SW at the next follow up visit.      CHW Next Outreach: In one month.

## 2022-03-31 DIAGNOSIS — Z92.89 HISTORY OF THERAPEUTIC ABORTION: Primary | ICD-10-CM

## 2022-04-01 ENCOUNTER — VIRTUAL VISIT (OUTPATIENT)
Dept: PSYCHOLOGY | Facility: CLINIC | Age: 22
End: 2022-04-01
Payer: COMMERCIAL

## 2022-04-01 DIAGNOSIS — F32.A DEPRESSION, UNSPECIFIED DEPRESSION TYPE: Primary | ICD-10-CM

## 2022-04-01 PROCEDURE — 90832 PSYTX W PT 30 MINUTES: CPT | Mod: 95

## 2022-04-01 NOTE — Clinical Note
Noelle Blake,  Thank you for your referral for Param Polanco. Param reports the situation that brought her to therapy has resolved and she is no longer interested in therapy at this time. I have provided her with our phone number, [phone] if things should change.   Thank you once again for the referral,  ANNITA Marley

## 2022-04-01 NOTE — PROGRESS NOTES
"                     Discharge Summary  Single Session    Client Name: Param Polanco MRN#: [id number] YOB: 2000    Discharge Date:   April 1, 2022    Service Modality: Phone Visit:      Provider verified identity through the following two step process.  Patient provided:  Patient is known previously to provider    The patient has been notified of the following:      \"We have found that certain health care needs can be provided without the need for a face to face visit.  This service lets us provide the care you need with a phone conversation.       I will have full access to your [facility] medical record during this entire phone call.   I will be taking notes for your medical record.      Since this is like an office visit, we will bill your insurance company for this service.       There are potential benefits and risks of telephone visits (e.g. limits to patient confidentiality) that differ from in-person visits.?Confidentiality still applies for telephone services, and nobody will record the visit.  It is important to be in a quiet, private space that is free of distractions (including cell phone or other devices) during the visit.??      If during the course of the call I believe a telephone visit is not appropriate, you will not be charged for this service\"     Consent has been obtained for this service by care team member: Yes     Service Type: Individual      Session Start Time: 12:30 PM  Session End Time: 12:50 PM      Session Length: 20 - 30     Session #: 2     Attendees: Client and Sara       Focus of Treatment Objective(s):  Client's presenting concerns included: Depressed Mood - post psychosocial stressors which client state's has resloved since PCP placed referral for services  Stage of Change at time of Discharge: PRECONTEMPLATION (Not seeing need for change)    Medication Adherence:  NA    Chemical Use:  NA    Assessment: Current Emotional / Mental Status (status of " significant symptoms):    Risk status (Self / Other harm or suicidal ideation)  Client denies current fears or concerns for personal safety.  Client denies current or recent suicidal ideation or behaviors.  Client denies current or recent homicidal ideation or behaviors.  Client denies current or recent self injurious behavior or ideation.  Client denies other safety concerns.  A safety and risk management plan has not been developed at this time, however client was given the after-hours number should there be a change in any of these risk factors.    Appearance:   Telephone, Unable to assess   Eye Contact:   Telephone, Unable to assess   Psychomotor Behavior: Telephone, Unable to assess   Attitude:   Cooperative   Orientation:   All  Speech   Rate / Production: Normal/ Responsive   Volume:  Soft   Mood:    Normal  Affect:    Telephone, Unable to assess   Thought Content:  Clear   Thought Form:  Coherent  Logical   Insight:   Good     DSM5 Diagnoses: (Sustained by DSM5 Criteria Listed Above)  Diagnoses: 311 (F32.9) Unspecified Depressive Disorder   Psychosocial & Contextual Factors: Covid 19 pandemic and relationship strain with family and her partner    Reason for Discharge:  Client declines a need for therapy at this time. Client states previous situation has gotten better and she is doing well and does not wish to meet with a therapist.      Aftercare Plan:  Client may resume counseling services at any time in the future by calling the [facility] Intake Office, [phone].      Giovana Orlando, LICSW  April 1, 2022  Note reviewed and clinical supervision by SHENA Jameson, LICSW 4/1/2022

## 2022-04-04 ENCOUNTER — PATIENT OUTREACH (OUTPATIENT)
Dept: CARE COORDINATION | Facility: CLINIC | Age: 22
End: 2022-04-04
Payer: COMMERCIAL

## 2022-04-04 DIAGNOSIS — Z71.89 COMPLEX CARE COORDINATION: Primary | ICD-10-CM

## 2022-04-04 NOTE — LETTER
[facility] COORDINATION   [facility] [address]  [place] [address]  2022    Smu Paw  [address]  [place] [address]    Dear [facility],  Your Care Team congratulates you on your journey to maintain wellness. This document will help guide you on your journey to maintain a healthy lifestyle.  You can use this to help you overcome any barriers you may encounter.  If you should have any questions or concerns, you can contact the members of your Care Team or contact your Primary Care Clinic for assistance.     Health Maintenance  Health Maintenance Reviewed:      My Access Plan  Medical Emergency 911   Primary Clinic Line [facility] [phone]   24 Hour Appointment Line [phone] or  [phone]) (toll-free)   24 Hour Nurse Line [phone] (toll-free)   Preferred Urgent Care     Preferred Hospital     Preferred Pharmacy [facility] - [place], [place] - [address]     Behavioral Health Crisis Line The National Suicide Prevention Lifeline at 1-728.427.1337 or 911     My Care Team Members  Patient Care Team       Relationship Specialty Notifications Start End    Adrian Rader MD PCP - General Family Practice  16     Phone: [phone] Fax: [phone]         [address] [place] [address]    Suma Fleming PA-C Assigned PCP   10/14/21     Phone: [phone] Fax: [phone]         [address] [place] [address]              Goals        COMPLETED: 1. [facility] (pt-stated)       Goal statement: I will attend my upcoming appointments with [facility] as scheduled.      Date goal set: 22  Barriers: English as a second language  Strengths: Willing to accept support  Date to achieve by: 22  Patient expressed understanding of goal: Yes     Action steps to achieve this goal:  1.  I have already had an  and do not need to follow up with [facility] at this time.    [facility]  Phone: [phone]  [address] [place] [address]    Updated: 3/22/22  Completed: 4/4/22         COMPLETED: [facility] (pt-stated)       Goal statement: I will attend all scheduled follow up appointments with Dr. Blake throughout the next 60 days.     Date goal set: 2/14/22  Barriers: English as a second language  Strengths: Willing to accept support  Date to achieve by: 4/14/22  Patient expressed understanding of goal: Yes     Action steps to achieve this goal:  1.  I will reschedule my missed appointment with Dr. Blake on 3/25/22 when [facility] scheduling calls me.    Updated: 3/22/22  Completed: 4/4/22             Advance Care Plans/Directives Type:      We notice that you do not have an Advance Directive on file. Upon completion of your Health Care Directive, please bring a copy with you to your next office visit.    It has been your Clinic Care Team's pleasure to work with you on your goals.    Regards,  Your Clinic Care Team

## 2022-04-05 NOTE — PROGRESS NOTES
Clinic Care Coordination Contact     Situation: Pt chart reviewed by  care coordinator.     Background:   - Most recent  assessment completed on 22.  - No ED visits or hospitalizations in the past 45 days.  - Pt initially enrolled for assistance navigating resources for pregnancy termination.   - Pt was approximately 7-8 week pregnant at time of enrollment. OB provider = Lou.   - Assisted pt in scheduling initial consult (via phone) with [facility] provider on , procedure pre-visit (via phone) on , and termination procedure appointment (in-person) at [facility] location on .  - Pt spoke with clinic RN Carolyn Cantu on 22 and shared that she proceeded with medical  by ordering medication through [facility]. Took first dose on 22 and second dose on 22. See nurse triage encounter dated 2/15/22 for detailed notes summarizing phone conversations.  - Pt followed up with OB provider (Dr. Blake) in-clinic on 3/4/22 post termination to discuss mental health, recheck pregnancy test, and address contraceptive management. Depo shot received that day; next depo shot scheduled for 22. Pt also agreed to referral for therapy.   - With pt's permission, clinic RN Carolyn Cantu has provided a verbal referral to GER's ([facility]) Family Assister and Employment Counselor teams, as pt has expressed interest in returning to school to finish her GED and/or look for part-time work. GERM will outreach directly to pt to further assist.  - Pt met virtually with a therapist through [facility] on 3/14/22 and again on 22. Per note dated 22, pt declines need for therapy at this time and thus she has been discharged.   - Last outreach by CHW on 3/22/22. CHW left voicemail for [facility] at that time in order to clarify whether or not pt needed to  reschedule appointments. This is not necessary as pregnancy termination is already completed. Pt was reminded of her f/u visit with SW in-clinic today during that outreach call as well.   - Pt was a no show for her in-person f/u visit with DWIGHT Roche, LSW on 4/4/22. She may contact the clinic directly at [phone] if new concerns arise.     Assessment: All previous goals completed. Episode resolved. Enrollment status adjusted. CCC team members removed from Care team. Graduation letter generated.     Plan:  1.) Pt will be immediately graduated from [employer]. No further outreach scheduled at this time.

## 2022-04-05 NOTE — PROGRESS NOTES
Clinic Care Coordination Contact     Pt was a no show for in-person f/u visit with SHAGUFTA RocheW, LSW today.

## 2022-04-20 ENCOUNTER — VIRTUAL VISIT (OUTPATIENT)
Dept: PEDIATRICS | Facility: CLINIC | Age: 22
End: 2022-04-20
Payer: COMMERCIAL

## 2022-04-20 DIAGNOSIS — R30.0 DYSURIA: Primary | ICD-10-CM

## 2022-04-20 PROCEDURE — 99213 OFFICE O/P EST LOW 20 MIN: CPT | Mod: 95 | Performed by: NURSE PRACTITIONER

## 2022-04-20 NOTE — PROGRESS NOTES
Param is a 22 year old who is being evaluated via a billable telephone visit.      What phone number would you like to be contacted at? [phone]  How would you like to obtain your AVS? Rick    Assessment & Plan     1. Dysuria  Given dysuria x 1 month, but is now worsening recommend she be seen in clinic for UA and possible wet prep. Patient had  1.5 months ago as well. Should be seen in the clinic for exam, follow up aborption + obtain a urine in the next 1-2 days, sooner if she were to develop new or worsening symptoms such as vomiting, fevers, bleeding, etc.     *Her UA 1 month ago looked a bit concerning for UTI, but culture was not done. It looks like she was sent an abx. Unclear if this is reinfection or if it never cleared 1 month ago. Regardless, should be seen back in the clinic for exam.     Return in about 1 day (around 2022) for in person.    Mercy Brooke, SEB, CPNP-AC/PC, IBCLC    [facility]    [facility]   Param is a 22 year old who presents for the following health issues  accompanied by her self .    HPI     Genitourinary - Female  Onset/Duration: 1 month ago  Description:   Painful urination (Dysuria): YES           Frequency: YES  Blood in urine (Hematuria): no  Delay in urine (Hesitency): no  Intensity: severe, 9/10, gets better when she drinks water  Progression of Symptoms:  worsening  Accompanying Signs & Symptoms:  Fever/chills: no  Flank pain: YES  Nausea and vomiting: no  Vaginal symptoms: none  Abdominal/Pelvic Pain: no  History:   History of frequent UTI s: YES  History of kidney stones: no  Sexually Active: YES- 1 month ago  Possibility of pregnancy: No- had  in February   Precipitating or alleviating factors: None  Therapies tried and outcome: Increase fluid intake     Param is a 22 year old who presents for telephone visit with concern for UTI. States that for the last 1 month, she has been having burning when she pees. She rates pain as 9/10  "when it happens, but then it will go away. She also states that she has an increase in frequency and urgency. Sometimes \"some comes out\" but not always. She states that she just wants to \"sleep in the bathroom.\" No fevers, abdominal pain or vomiting.     Smu denies any vaginal bleeding, discharge, or unusual odor. She has been sexually active, but denies having intercourse in the last 1 month. She had an  1 month ago and is now on birth control.         Objective           Vitals:  No vitals were obtained today due to virtual visit.    Exam unable to be completed due to telephone visits          Phone call duration: 9 minutes  "

## 2022-04-21 ENCOUNTER — OFFICE VISIT (OUTPATIENT)
Dept: FAMILY MEDICINE | Facility: CLINIC | Age: 22
End: 2022-04-21
Payer: COMMERCIAL

## 2022-04-21 VITALS
HEART RATE: 89 BPM | OXYGEN SATURATION: 97 % | DIASTOLIC BLOOD PRESSURE: 68 MMHG | TEMPERATURE: 98.2 F | SYSTOLIC BLOOD PRESSURE: 104 MMHG | WEIGHT: 104.4 LBS | HEIGHT: 59 IN | BODY MASS INDEX: 21.05 KG/M2

## 2022-04-21 DIAGNOSIS — N30.00 ACUTE CYSTITIS WITHOUT HEMATURIA: Primary | ICD-10-CM

## 2022-04-21 LAB
ALBUMIN UR-MCNC: NEGATIVE MG/DL
APPEARANCE UR: ABNORMAL
BILIRUB UR QL STRIP: NEGATIVE
COLOR UR AUTO: YELLOW
GLUCOSE UR STRIP-MCNC: NEGATIVE MG/DL
HGB UR QL STRIP: ABNORMAL
KETONES UR STRIP-MCNC: NEGATIVE MG/DL
LEUKOCYTE ESTERASE UR QL STRIP: ABNORMAL
NITRATE UR QL: NEGATIVE
PH UR STRIP: 5.5 [PH] (ref 5–7)
SP GR UR STRIP: 1.02 (ref 1–1.03)
UROBILINOGEN UR STRIP-ACNC: 0.2 E.U./DL

## 2022-04-21 PROCEDURE — 81003 URINALYSIS AUTO W/O SCOPE: CPT | Performed by: NURSE PRACTITIONER

## 2022-04-21 PROCEDURE — 99214 OFFICE O/P EST MOD 30 MIN: CPT | Performed by: NURSE PRACTITIONER

## 2022-04-21 RX ORDER — NITROFURANTOIN 25; 75 MG/1; MG/1
100 CAPSULE ORAL 2 TIMES DAILY
Qty: 10 CAPSULE | Refills: 0 | Status: SHIPPED | OUTPATIENT
Start: 2022-04-21 | End: 2022-04-26

## 2022-04-21 RX ORDER — PHENAZOPYRIDINE HYDROCHLORIDE 100 MG/1
100 TABLET, FILM COATED ORAL 3 TIMES DAILY PRN
Qty: 21 TABLET | Refills: 0 | Status: SHIPPED | OUTPATIENT
Start: 2022-04-21 | End: 2022-09-08

## 2022-04-21 NOTE — PROGRESS NOTES
"  Assessment & Plan     Param was seen today for uti.    Diagnoses and all orders for this visit:    Acute cystitis without hematuria  -     UA without Microscopic - lab collect; Future  -     nitroFURantoin macrocrystal-monohydrate (MACROBID) 100 MG capsule; Take 1 capsule (100 mg) by mouth 2 times daily for 5 days  -     phenazopyridine (PYRIDIUM) 100 MG tablet; Take 1 tablet (100 mg) by mouth 3 times daily as needed for urinary tract discomfort                     Tobacco Cessation:   reports that she has never smoked. She uses smokeless tobacco.      FUTURE APPOINTMENTS:       - Follow-up for annual visit or as needed      SALOMÓN Meredith CNP  [facility]    Haylee Virgen is a 22 year old who presents for the following health issues     Dysuria, frequency; abd and flank pain x 3-4 days      UTI    History of Present Illness       Reason for visit:  I have a uti to get the uti pill  Symptom onset:  Today    She eats 2-3 servings of fruits and vegetables daily.She consumes 1 sweetened beverage(s) daily.She exercises with enough effort to increase her heart rate 9 or less minutes per day.  She exercises with enough effort to increase her heart rate 3 or less days per week.   She is taking medications regularly.             Review of Systems   Constitutional, HEENT, cardiovascular, pulmonary, gi and systems are negative, except as otherwise noted.      Objective    /68 (BP Location: Left arm, Patient Position: Sitting, Cuff Size: Adult Regular)   Pulse 89   Temp 98.2  F (36.8  C) (Temporal)   Ht 1.499 m (4' 11\")   Wt 47.4 kg (104 lb 6.4 oz)   SpO2 97%   BMI 21.09 kg/m    Body mass index is 21.09 kg/m .  Physical Exam   GENERAL: healthy, alert and no distress  CV: regular rate and rhythm, normal S1 S2, no S3 or S4, no murmur, click or rub, no peripheral edema and peripheral pulses strong  ABDOMEN: tenderness suprapubic     Results for orders placed or performed in visit on " 04/21/22   UA without Microscopic - lab collect     Status: Abnormal   Result Value Ref Range    Color Urine Yellow Colorless, Straw, Light Yellow, Yellow    Appearance Urine Slightly Cloudy (A) Clear    Glucose Urine Negative Negative mg/dL    Bilirubin Urine Negative Negative    Ketones Urine Negative Negative mg/dL    Specific Gravity Urine 1.025 1.003 - 1.035    Blood Urine Trace (A) Negative    pH Urine 5.5 5.0 - 7.0    Protein Albumin Urine Negative Negative mg/dL    Urobilinogen Urine 0.2 0.2, 1.0 E.U./dL    Nitrite Urine Negative Negative    Leukocyte Esterase Urine Small (A) Negative

## 2022-04-28 ENCOUNTER — TELEPHONE (OUTPATIENT)
Dept: FAMILY MEDICINE | Facility: CLINIC | Age: 22
End: 2022-04-28

## 2022-04-28 DIAGNOSIS — R30.0 DYSURIA: Primary | ICD-10-CM

## 2022-04-28 NOTE — TELEPHONE ENCOUNTER
Reason for Call:  Medication or medication refill:    Do you use a [facility]?  Name of the [facility] and [facility] the [facility]    Name of the medication requested: N/A    Other request: Needs new Rx, the current meds for UTI are not working.    Can we leave a detailed message on this number? YES    Phone number patient can be reached at: Cell number on file:    Telephone Information:   Mobile [phone]       Best Time: ANY    Call taken on 4/28/2022 at 11:39 AM by Lexus Pond

## 2022-04-28 NOTE — TELEPHONE ENCOUNTER
"Heavenly VILLARREAL    Called with Sara     S-(situation): Pt states  \"the urine infection still there\" When she urinates it still burns. It was a little better for a while but the symptoms started again. Orange or red urine. She denies frequency . No fever.     B-(background): nitrofurantion on 4/21 for 5 days. States she took the full course of the antibx, still has some pyridium    She made appt for tomorrow at noon with lab for repeat UA    (pt was advised to drink a lot of water, avoid soaps when washing her bottom)    Jody Pratt, RN, BSN  [facility]       "

## 2022-04-28 NOTE — TELEPHONE ENCOUNTER
"What does she mean \"its not working\" has she completed the full course of therapy? Recommend repeat UA/UC    SALOMÓN Meredith CNP    "

## 2022-04-29 ENCOUNTER — LAB (OUTPATIENT)
Dept: LAB | Facility: CLINIC | Age: 22
End: 2022-04-29
Payer: COMMERCIAL

## 2022-04-29 DIAGNOSIS — Z92.89 HISTORY OF THERAPEUTIC ABORTION: ICD-10-CM

## 2022-04-29 DIAGNOSIS — R30.0 DYSURIA: ICD-10-CM

## 2022-04-29 LAB
ALBUMIN UR-MCNC: NEGATIVE MG/DL
APPEARANCE UR: CLEAR
B-HCG SERPL-ACNC: <1 IU/L (ref 0–5)
BACTERIA #/AREA URNS HPF: ABNORMAL /HPF
BILIRUB UR QL STRIP: NEGATIVE
COLOR UR AUTO: YELLOW
GLUCOSE UR STRIP-MCNC: NEGATIVE MG/DL
HGB UR QL STRIP: ABNORMAL
KETONES UR STRIP-MCNC: NEGATIVE MG/DL
LEUKOCYTE ESTERASE UR QL STRIP: ABNORMAL
NITRATE UR QL: POSITIVE
PH UR STRIP: 6.5 [PH] (ref 5–7)
RBC #/AREA URNS AUTO: ABNORMAL /HPF
SP GR UR STRIP: 1.02 (ref 1–1.03)
UROBILINOGEN UR STRIP-ACNC: 0.2 E.U./DL
WBC #/AREA URNS AUTO: ABNORMAL /HPF

## 2022-04-29 PROCEDURE — 87086 URINE CULTURE/COLONY COUNT: CPT

## 2022-04-29 PROCEDURE — 36415 COLL VENOUS BLD VENIPUNCTURE: CPT

## 2022-04-29 PROCEDURE — 81001 URINALYSIS AUTO W/SCOPE: CPT

## 2022-04-29 PROCEDURE — 84702 CHORIONIC GONADOTROPIN TEST: CPT

## 2022-05-01 LAB — BACTERIA UR CULT: NO GROWTH

## 2022-05-04 ENCOUNTER — TELEPHONE (OUTPATIENT)
Dept: FAMILY MEDICINE | Facility: CLINIC | Age: 22
End: 2022-05-04
Payer: COMMERCIAL

## 2022-05-04 DIAGNOSIS — N91.2 AMENORRHEA: Primary | ICD-10-CM

## 2022-05-04 NOTE — TELEPHONE ENCOUNTER
----- Message from SALOMÓN Meredith CNP sent at 5/4/2022  2:43 PM CDT -----  Team,    Please call patient to follow up with UTI symptoms.     SALOMÓN Meredith CNP

## 2022-05-04 NOTE — TELEPHONE ENCOUNTER
Lab is preferred due to increased risk of major birth defects and/or miscarriage with Bactrim/Cipro. If she can come today or tomorrow would be great and I will place medication for  pharmacy for .

## 2022-05-04 NOTE — TELEPHONE ENCOUNTER
Due to previous pregnancy and missed period recommend check [facility], if not pregnant can place order for different antibiotic.   SALOMÓN Meredith CNP

## 2022-05-04 NOTE — TELEPHONE ENCOUNTER
MILAN Castillo CNP-Please review and advise if you recommend follow up either in clinic/Urgent Care or with OB/GYN?    Writer called patient with Sara  #[id number]:    Per patient:  1.Still has UTI symptoms:  2. After school urinated and had significant pain with urinating  3. Afebrile  4. No bloody/pink color to urine  5. Back pain still present and worsening   A. Denied flank and abdominal pain  6. Has not had menstrual period for the past 2-3 months   A. Usually has it the very beginning or very end of the month    Thank you!  SHAGUFTA SheridanN, RN  [facility]

## 2022-05-04 NOTE — TELEPHONE ENCOUNTER
Writer called patient with  Sara  #[id number]:  Reviewed plan per MILAN Castillo CNP.    Patient verbalized understanding and in agreement with plan.    Lab appt scheduled on 5/5/22.  Date, time and location of lab appt confirmed with patient.    Confirmed 5/27/22 appt at [facility] for Depo injection with patient.    SHAGUFTA SheridanN, RN  [facility]

## 2022-05-05 ENCOUNTER — TELEPHONE (OUTPATIENT)
Dept: FAMILY MEDICINE | Facility: CLINIC | Age: 22
End: 2022-05-05

## 2022-05-05 ENCOUNTER — LAB (OUTPATIENT)
Dept: LAB | Facility: CLINIC | Age: 22
End: 2022-05-05
Payer: COMMERCIAL

## 2022-05-05 ENCOUNTER — OFFICE VISIT (OUTPATIENT)
Dept: URGENT CARE | Facility: URGENT CARE | Age: 22
End: 2022-05-05
Payer: COMMERCIAL

## 2022-05-05 VITALS
DIASTOLIC BLOOD PRESSURE: 58 MMHG | SYSTOLIC BLOOD PRESSURE: 90 MMHG | HEART RATE: 94 BPM | OXYGEN SATURATION: 97 % | TEMPERATURE: 98.4 F | WEIGHT: 104 LBS | RESPIRATION RATE: 16 BRPM | BODY MASS INDEX: 21.01 KG/M2

## 2022-05-05 DIAGNOSIS — N91.2 AMENORRHEA: ICD-10-CM

## 2022-05-05 DIAGNOSIS — B86 SCABIES: Primary | ICD-10-CM

## 2022-05-05 DIAGNOSIS — N34.2 INFECTIVE URETHRITIS: ICD-10-CM

## 2022-05-05 DIAGNOSIS — A74.9 CHLAMYDIA: ICD-10-CM

## 2022-05-05 DIAGNOSIS — N30.01 ACUTE CYSTITIS WITH HEMATURIA: Primary | ICD-10-CM

## 2022-05-05 LAB
BACTERIA #/AREA URNS HPF: ABNORMAL /HPF
HCG UR QL: NEGATIVE
RBC #/AREA URNS AUTO: ABNORMAL /HPF
SQUAMOUS #/AREA URNS AUTO: ABNORMAL /LPF
WBC #/AREA URNS AUTO: ABNORMAL /HPF

## 2022-05-05 PROCEDURE — 87491 CHLMYD TRACH DNA AMP PROBE: CPT | Performed by: FAMILY MEDICINE

## 2022-05-05 PROCEDURE — 81015 MICROSCOPIC EXAM OF URINE: CPT | Performed by: FAMILY MEDICINE

## 2022-05-05 PROCEDURE — 87086 URINE CULTURE/COLONY COUNT: CPT | Performed by: FAMILY MEDICINE

## 2022-05-05 PROCEDURE — 81025 URINE PREGNANCY TEST: CPT

## 2022-05-05 PROCEDURE — 87591 N.GONORRHOEAE DNA AMP PROB: CPT | Performed by: FAMILY MEDICINE

## 2022-05-05 PROCEDURE — 99214 OFFICE O/P EST MOD 30 MIN: CPT | Performed by: FAMILY MEDICINE

## 2022-05-05 RX ORDER — IVERMECTIN 3 MG/1
9 TABLET ORAL
Qty: 6 TABLET | Refills: 0 | Status: SHIPPED | OUTPATIENT
Start: 2022-05-05 | End: 2022-09-08

## 2022-05-05 RX ORDER — SULFAMETHOXAZOLE/TRIMETHOPRIM 800-160 MG
1 TABLET ORAL 2 TIMES DAILY
Qty: 14 TABLET | Refills: 0 | Status: SHIPPED | OUTPATIENT
Start: 2022-05-05 | End: 2022-05-05

## 2022-05-05 RX ORDER — CEFDINIR 300 MG/1
300 CAPSULE ORAL 2 TIMES DAILY
Qty: 14 CAPSULE | Refills: 0 | Status: SHIPPED | OUTPATIENT
Start: 2022-05-05 | End: 2022-05-12

## 2022-05-05 NOTE — PROGRESS NOTES
Assessment & Plan     Scabies  Likely cause of her rash. Discussed topical  ral treatment. Decided on the oral. educational info given regarding scabies and cleaning to prevent recurrence and retransmission.   - ivermectin (STROMECTOL) 3 MG TABS tablet  Dispense: 6 tablet; Refill: 0      UTI suspected. Cefdinir sent in for suspected uti. Culture is pending. Of note after visit the chlamydia was positive and my partern sent in doxy for this. This is probably the cause of her urinary symptoms rather than typical uti. Recommended follow up in a week. Would recommend further sti screening at that time.     Chlamydia  See above.   - Neisseria gonorrhoeae PCR  - Chlamydia trachomatis PCR - Clinic Collect  - Urine Microscopic  - Urine Culture  - cefdinir (OMNICEF) 300 MG capsule  Dispense: 14 capsule; Refill: 0  - doxycycline hyclate (VIBRA-TABS) 100 MG tablet  Dispense: 14 tablet; Refill: 0     30 minutes spent on the date of the encounter doing chart review, history and exam, documentation and further activities per the note. Seen with phone interpretor.   [id number]}     Return in about 1 week (around 5/12/2022) for follow up appointment.    Shankar Saldana MD  [facility]    ------------------------------------------------------------------------  Subjective     Smu Paw presents to clinic today for the following health issues:  chief complaint  HPI  2 concerns.   1: dysuria is not improving despite antibiotics. Urine culture was negative. No fever. No vaginal discharge. She is concerned the infection is not gone. She also would like testing for phillip and chlamydia bt no known exposure.     2: pruritic rash particularly at nigh. Diffuse red bumps scattered on abd, thighs arms and hands. None on her face. present just 1-2 days.       Review of Systems        Objective    BP 90/58   Pulse 94   Temp 98.4  F (36.9  C) (Temporal)   Resp 16   Wt 47.2 kg (104 lb)   SpO2 97%   Breastfeeding No   BMI 21.01 kg/m     Physical Exam   GENERAL: healthy, alert and no distress  RESP: lungs clear to auscultation - no rales, rhonchi or wheezes  CV: regular rate and rhythm, normal S1 S2, no S3 or S4, no murmur, click or rub, no peripheral edema and peripheral pulses strong  ABDOMEN: soft, nontender, no hepatosplenomegaly, no masses and bowel sounds normal  SKIN: erythematous 1-2mm papules scattered diffusely with excoriations about her umbilicus/ arms and thighs hands. She notes none on her pubic area.     ua .positive for wbcs.

## 2022-05-05 NOTE — LETTER
May 5, 2022      Param Polanco  [address] [place] [address]        To Whom It May Concern,     Param Polanco was seen today. Please excuse her absence from school. She should be able to return tomorrow.       Sincerely,        Shankar Saldana MD

## 2022-05-05 NOTE — TELEPHONE ENCOUNTER
Writer called marlys with Sara  #[id number]:  Left message to call back and ask to speak with an available triage nurse.  SHAGUFTA SheridanN, RN  [facility]

## 2022-05-06 LAB
C TRACH DNA SPEC QL NAA+PROBE: POSITIVE
N GONORRHOEA DNA SPEC QL NAA+PROBE: NEGATIVE

## 2022-05-06 RX ORDER — DOXYCYCLINE HYCLATE 100 MG
100 TABLET ORAL 2 TIMES DAILY
Qty: 14 TABLET | Refills: 0 | Status: SHIPPED | OUTPATIENT
Start: 2022-05-06 | End: 2022-05-13

## 2022-05-07 LAB — BACTERIA UR CULT: NORMAL

## 2022-05-10 NOTE — TELEPHONE ENCOUNTER
Dr. Saldana discontinued Bactrim.    See lab result note dated 5/6/22 from Dr. Smith.    SHAGUFTA SheridanN, RN  [facility]

## 2022-05-27 ENCOUNTER — ALLIED HEALTH/NURSE VISIT (OUTPATIENT)
Dept: FAMILY MEDICINE | Facility: CLINIC | Age: 22
End: 2022-05-27
Payer: COMMERCIAL

## 2022-05-27 DIAGNOSIS — Z30.42 DEPO-PROVERA CONTRACEPTIVE STATUS: Primary | ICD-10-CM

## 2022-05-27 PROCEDURE — 99207 PR NO CHARGE NURSE ONLY: CPT

## 2022-09-08 ENCOUNTER — OFFICE VISIT (OUTPATIENT)
Dept: FAMILY MEDICINE | Facility: CLINIC | Age: 22
End: 2022-09-08
Payer: COMMERCIAL

## 2022-09-08 VITALS
BODY MASS INDEX: 22.22 KG/M2 | WEIGHT: 110 LBS | DIASTOLIC BLOOD PRESSURE: 67 MMHG | SYSTOLIC BLOOD PRESSURE: 106 MMHG | HEART RATE: 81 BPM | RESPIRATION RATE: 16 BRPM

## 2022-09-08 DIAGNOSIS — Z11.1 SCREENING EXAMINATION FOR PULMONARY TUBERCULOSIS: Primary | ICD-10-CM

## 2022-09-08 PROCEDURE — 86481 TB AG RESPONSE T-CELL SUSP: CPT | Performed by: FAMILY MEDICINE

## 2022-09-08 PROCEDURE — 99212 OFFICE O/P EST SF 10 MIN: CPT | Performed by: FAMILY MEDICINE

## 2022-09-08 PROCEDURE — 36415 COLL VENOUS BLD VENIPUNCTURE: CPT | Performed by: FAMILY MEDICINE

## 2022-09-08 ASSESSMENT — PATIENT HEALTH QUESTIONNAIRE - PHQ9
10. IF YOU CHECKED OFF ANY PROBLEMS, HOW DIFFICULT HAVE THESE PROBLEMS MADE IT FOR YOU TO DO YOUR WORK, TAKE CARE OF THINGS AT HOME, OR GET ALONG WITH OTHER PEOPLE: NOT DIFFICULT AT ALL
SUM OF ALL RESPONSES TO PHQ QUESTIONS 1-9: 2
SUM OF ALL RESPONSES TO PHQ QUESTIONS 1-9: 2

## 2022-09-08 ASSESSMENT — ANXIETY QUESTIONNAIRES
3. WORRYING TOO MUCH ABOUT DIFFERENT THINGS: SEVERAL DAYS
IF YOU CHECKED OFF ANY PROBLEMS ON THIS QUESTIONNAIRE, HOW DIFFICULT HAVE THESE PROBLEMS MADE IT FOR YOU TO DO YOUR WORK, TAKE CARE OF THINGS AT HOME, OR GET ALONG WITH OTHER PEOPLE: NOT DIFFICULT AT ALL
4. TROUBLE RELAXING: NOT AT ALL
GAD7 TOTAL SCORE: 2
6. BECOMING EASILY ANNOYED OR IRRITABLE: NOT AT ALL
1. FEELING NERVOUS, ANXIOUS, OR ON EDGE: NOT AT ALL
5. BEING SO RESTLESS THAT IT IS HARD TO SIT STILL: NOT AT ALL
8. IF YOU CHECKED OFF ANY PROBLEMS, HOW DIFFICULT HAVE THESE MADE IT FOR YOU TO DO YOUR WORK, TAKE CARE OF THINGS AT HOME, OR GET ALONG WITH OTHER PEOPLE?: NOT DIFFICULT AT ALL
7. FEELING AFRAID AS IF SOMETHING AWFUL MIGHT HAPPEN: SEVERAL DAYS
GAD7 TOTAL SCORE: 2
2. NOT BEING ABLE TO STOP OR CONTROL WORRYING: NOT AT ALL
7. FEELING AFRAID AS IF SOMETHING AWFUL MIGHT HAPPEN: SEVERAL DAYS
GAD7 TOTAL SCORE: 2

## 2022-09-08 NOTE — PROGRESS NOTES
Assessment & Plan     Screening examination for pulmonary tuberculosis  Patient requests quantiferon gold testing instead of PPD.  She has no known history of TB exposure, does not think she has had BCG vaccination.  She has no symptoms of active TB.  - Quantiferon TB Gold Plus             Nicotine/Tobacco Cessation:  She reports that she has never smoked. She uses smokeless tobacco.          Return in about 2 months (around 11/8/2022) for Routine preventive.    Michelle Miranda MD  [facility]    Haylee Silvau is a 22 year old, presenting for the following health issues:  Orders (Pt is starting a new job and needs TB Gold testing)  She is interviewed with the assistance of a Sara  over the phone.      History of Present Illness       Reason for visit:  TB Gold Test    She eats 0-1 servings of fruits and vegetables daily.She consumes 0 sweetened beverage(s) daily.She exercises with enough effort to increase her heart rate 30 to 60 minutes per day.  She exercises with enough effort to increase her heart rate 7 days per week.   She is taking medications regularly.    Today's PHQ-9         PHQ-9 Total Score: 2    PHQ-9 Q9 Thoughts of better off dead/self-harm past 2 weeks :   Not at all    How difficult have these problems made it for you to do your work, take care of things at home, or get along with other people: Not difficult at all  Today's JULIA-7 Score: 2     Patient presents today to be screened for pulmonary tuberculosis.  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  She requests blood testing today.  She has resided in the [place] for 7 years, was born in [place] and then lived in [place].  She denies fever, chills, chronic cough, bloody sputum, loss of appetite or fatigue.  She needs a note for high school as she is missing class to be here today.    Review of Systems   Constitutional, HEENT, cardiovascular,  pulmonary, gi and gu systems are negative, except as otherwise noted.      Objective    /67 (BP Location: Left arm, Patient Position: Sitting, Cuff Size: Adult Regular)   Pulse 81   Resp 16   Wt 49.9 kg (110 lb)   BMI 22.22 kg/m    Body mass index is 22.22 kg/m .  Physical Exam   GENERAL: healthy, alert and no distress  EYES: Eyes grossly normal to inspection, PERRL and conjunctivae and sclerae normal  RESP: lungs clear to auscultation - no rales, rhonchi or wheezes  CV: regular rate and rhythm, normal S1 S2, no S3 or S4, no murmur, click or rub, no peripheral edema and peripheral pulses strong  MS: no gross musculoskeletal defects noted, no edema  SKIN: no suspicious lesions or rashes  NEURO: Normal strength and tone, mentation intact and speech normal  PSYCH: mentation appears normal, affect normal/bright    Diagnostics: Pending

## 2022-09-08 NOTE — LETTER
September 8, 2022      Param Polanco  [address] [place] [address]        To Whom It May Concern:    Param Polanco  was seen on 9/8/22.  She may return to school without restrictions.        Sincerely,        Michelle Miranda MD

## 2022-09-08 NOTE — LETTER
September 11, 2022      [facility]  [address] [place] [address]        Dear ,    We are writing to inform you of your test results.    Your tuberculosis screen returned negative.    Resulted Orders   Quantiferon TB Gold Plus Grey Tube   Result Value Ref Range    Quantiferon Nil Tube 0.13 IU/mL   Quantiferon TB Gold Plus Green Tube   Result Value Ref Range    Quantiferon TB1 Tube 0.15 IU/mL   Quantiferon TB Gold Plus Yellow Tube   Result Value Ref Range    Quantiferon TB2 Tube 0.18    Quantiferon TB Gold Plus Purple Tube   Result Value Ref Range    Quantiferon Mitogen 10.00 IU/mL   Quantiferon TB Gold Plus   Result Value Ref Range    Quantiferon-TB Gold Plus Negative Negative      Comment:      No interferon gamma response to M.tuberculosis antigens was detected. Infection with M.tuberculosis is unlikely, however a single negative result does not exclude infection. In patients at high risk for infection, a second test should be considered in accordance with the 2017 ATS/IDSA/CDC Clinical Pract  ice Guidelines for Diagnosis of Tuberculosis in Adults and Children     TB1 Ag minus Nil Value 0.02 IU/mL    TB2 Ag minus Nil Value 0.05 IU/mL    Mitogen minus Nil Result 9.87 IU/mL    Nil Result 0.13 IU/mL       If you have any questions or concerns, please call the clinic at the number listed above.       Sincerely,      Michelle Miranda MD

## 2022-09-10 LAB
QUANTIFERON MITOGEN: 10 IU/ML
QUANTIFERON NIL TUBE: 0.13 IU/ML
QUANTIFERON TB1 TUBE: 0.15 IU/ML
QUANTIFERON TB2 TUBE: 0.18

## 2022-09-11 LAB
GAMMA INTERFERON BACKGROUND BLD IA-ACNC: 0.13 IU/ML
M TB IFN-G BLD-IMP: NEGATIVE
M TB IFN-G CD4+ BCKGRND COR BLD-ACNC: 9.87 IU/ML
MITOGEN IGNF BCKGRD COR BLD-ACNC: 0.02 IU/ML
MITOGEN IGNF BCKGRD COR BLD-ACNC: 0.05 IU/ML

## 2022-11-04 ENCOUNTER — OFFICE VISIT (OUTPATIENT)
Dept: FAMILY MEDICINE | Facility: CLINIC | Age: 22
End: 2022-11-04
Payer: COMMERCIAL

## 2022-11-04 VITALS
DIASTOLIC BLOOD PRESSURE: 62 MMHG | HEART RATE: 84 BPM | WEIGHT: 111 LBS | SYSTOLIC BLOOD PRESSURE: 98 MMHG | BODY MASS INDEX: 22.38 KG/M2 | RESPIRATION RATE: 16 BRPM | HEIGHT: 59 IN | TEMPERATURE: 99.4 F

## 2022-11-04 DIAGNOSIS — N30.00 ACUTE CYSTITIS WITHOUT HEMATURIA: Primary | ICD-10-CM

## 2022-11-04 LAB
ALBUMIN UR-MCNC: NEGATIVE MG/DL
APPEARANCE UR: CLEAR
BACTERIA #/AREA URNS HPF: ABNORMAL /HPF
BILIRUB UR QL STRIP: NEGATIVE
COLOR UR AUTO: YELLOW
GLUCOSE UR STRIP-MCNC: NEGATIVE MG/DL
HCG UR QL: NEGATIVE
HGB UR QL STRIP: NEGATIVE
KETONES UR STRIP-MCNC: NEGATIVE MG/DL
LEUKOCYTE ESTERASE UR QL STRIP: ABNORMAL
NITRATE UR QL: POSITIVE
PH UR STRIP: 6.5 [PH] (ref 5–7)
RBC #/AREA URNS AUTO: ABNORMAL /HPF
SP GR UR STRIP: 1.02 (ref 1–1.03)
SQUAMOUS #/AREA URNS AUTO: ABNORMAL /LPF
UROBILINOGEN UR STRIP-ACNC: 0.2 E.U./DL
WBC #/AREA URNS AUTO: ABNORMAL /HPF

## 2022-11-04 PROCEDURE — 81001 URINALYSIS AUTO W/SCOPE: CPT | Performed by: PHYSICIAN ASSISTANT

## 2022-11-04 PROCEDURE — 87086 URINE CULTURE/COLONY COUNT: CPT | Performed by: PHYSICIAN ASSISTANT

## 2022-11-04 PROCEDURE — 99213 OFFICE O/P EST LOW 20 MIN: CPT | Performed by: PHYSICIAN ASSISTANT

## 2022-11-04 PROCEDURE — 81025 URINE PREGNANCY TEST: CPT | Performed by: PHYSICIAN ASSISTANT

## 2022-11-04 PROCEDURE — 87186 SC STD MICRODIL/AGAR DIL: CPT | Performed by: PHYSICIAN ASSISTANT

## 2022-11-04 RX ORDER — CEPHALEXIN 500 MG/1
500 CAPSULE ORAL 3 TIMES DAILY
Qty: 21 CAPSULE | Refills: 0 | Status: SHIPPED | OUTPATIENT
Start: 2022-11-04 | End: 2022-11-11

## 2022-11-04 NOTE — PROGRESS NOTES
Assessment & Plan   Acute cystitis without hematuria  History and exam consistent with a UTI today. Vitals wnl. Exam benign. No back pain or systemic symptoms to be concerned for complicated UTI or Pyelo at this time.   UA positive for nitrites and LE. UPT negative. Rx for Keflex for 7 days UC pending.   Encouraged to stay well hydrated.  - UA macro with reflex to Microscopic and Culture - Clinc Collect  - Urine Culture  - HCG qualitative urine; Future  - cephALEXin (KEFLEX) 500 MG capsule; Take 1 capsule (500 mg) by mouth 3 times daily for 7 days      Return if symptoms worsen or fail to improve, for In-Clinic Visit.    SRIDEVI [facility]    Subjective   Smu is a 22 year old, presenting for the following health issues:  Urinary Problem    History of Present Illness       Reason for visit:  It for uti    She eats 0-1 servings of fruits and vegetables daily.She consumes 2 sweetened beverage(s) daily.She exercises with enough effort to increase her heart rate 9 or less minutes per day.  She exercises with enough effort to increase her heart rate 5 days per week.   She is taking medications regularly.     Genitourinary - Female  Onset/Duration: 1 week   Description:   Painful urination (Dysuria): YES           Frequency: YES  Blood in urine (Hematuria): No  Delay in urine (Hesitency): No  Intensity: moderate  Progression of Symptoms:  worsening  Accompanying Signs & Symptoms:  Fever/chills: No  Flank pain: No  Nausea and vomiting: No  Vaginal symptoms: none  Abdominal/Pelvic Pain: No  History:   History of frequent UTI s: No  History of kidney stones: No  Sexually Active: YES  Possibility of pregnancy: No  Precipitating or alleviating factors: None  Therapies tried and outcome:  none  Chlamydia  2022  Stopped Depo inj due to wt gain.   States periods have not been the same since  in 2022    Nipple Pain -x 1 week left nipple pain. Joy any discharge from  "nipple. Itching.  Denies nausea, vomiting, fatigue, lumps or bumps.     Review of Systems   See HPI         Objective    BP 98/62   Pulse 84   Temp 99.4  F (37.4  C) (Tympanic)   Resp 16   Ht 1.499 m (4' 11\")   Wt 50.3 kg (111 lb)   BMI 22.42 kg/m    Body mass index is 22.42 kg/m .  Physical Exam   Constitutional: healthy, alert, and no distress  Head: Normocephalic. Atraumatic  Eyes: No conjunctival injection, sclera anicteric  Respiratory: No resp distress.  Musculoskeletal: extremities normal- no gross deformities noted, and normal muscle tone  Neurologic: Gait normal. CN 2-12 grossly intact  Psychiatric: mentation appears normal and affect normal/bright     Results for orders placed or performed in visit on 11/04/22   UA macro with reflex to Microscopic and Culture - Clinc Collect     Status: Abnormal    Specimen: Urine, Midstream   Result Value Ref Range    Color Urine Yellow Colorless, Straw, Light Yellow, Yellow    Appearance Urine Clear Clear    Glucose Urine Negative Negative, 1000 , >=2000 mg/dL    Bilirubin Urine Negative Negative    Ketones Urine Negative Negative, 160  mg/dL    Specific Gravity Urine 1.020 1.003 - 1.035    Blood Urine Negative Negative    pH Urine 6.5 5.0 - 7.0    Protein Albumin Urine Negative Negative, 300 , >=2000 mg/dL    Urobilinogen Urine 0.2 0.2, 1.0 E.U./dL    Nitrite Urine Positive (A) Negative    Leukocyte Esterase Urine Small (A) Negative   Urine Microscopic Exam     Status: Abnormal   Result Value Ref Range    Bacteria Urine Many (A) None Seen /HPF    RBC Urine 0-2 0-2 /HPF /HPF    WBC Urine 10-25 (A) 0-5 /HPF /HPF    Squamous Epithelials Urine Few (A) None Seen /LPF   HCG qualitative urine     Status: Normal   Result Value Ref Range    hCG Urine Qualitative Negative Negative                "

## 2022-11-04 NOTE — LETTER
November 4, 2022      Param Polanco  [address] [place] [address]        To Whom It May Concern:    Param Polanco was seen in our clinic. Please excuse Param from school today 11/4/22. She may return to school without restrictions.      Sincerely,        Ej Mina PA-C

## 2022-11-06 LAB — BACTERIA UR CULT: ABNORMAL

## 2023-04-05 NOTE — PROGRESS NOTES
Assessment/Plan:     Param Polanco is a 22 year old No obstetric history on file. here for confirmation of pregnancy.    Param was seen today for pregnancy cnfirmation.    Diagnoses and all orders for this visit:    Pregnancy, unspecified gestational age: UPT positive. Unsure LMP but likely sometime in December, making her around 7-8 weeks gestation? Unplanned pregnancy and patient is considering termination. Her boyfriend accompanied her to clinic, and I did ask him to step out and I discussed options with patient without her boyfriend in the room, including termination resources ([facility] and [facility]). She denies any safety concerns. Reviewed that she has option for medical management/termination due to early gestational age- I offered to give her the #'s for clinics who provide termination services, but patient reports she would like assistance with calling. When her boyfriend was out of the room, she did disclose that her boyfriend wants a termination and her sister has told her that she should keep the pregnancy- she remains uncertain. I encouraged her to take the weekend, reviewed this is her decision, and scheduled a follow-up Monday to continue reviewing options and support for patient. I did prescribe zofran to help with her significant nausea symptoms.  -     ondansetron (ZOFRAN) 4 MG tablet; Take 1 tablet (4 mg) by mouth every 12 hours as needed for nausea  -     HCG qualitative urine      Return to clinic Monday to review options again and assist patient with calling [facility] if desired- she was requesting assistance with this due to language barrier.    30 minutes spent on the date of the encounter doing chart review, history and exam, documentation and further activities as noted above      Subjective:     Param Polanco is a 22 year old female who presents for evaluation of amenorrhea   Patient's last menstrual period was sometime in December- but patient does not remember date  "Review of self-referral to Oncology for metastatic pancreatic cancer. Referral called in by pt's daughter Pancho.        [facility]    3/29/23 cscope rectosig mass + mets fr panc primary, CT CAP done, CEA/CA19 high, per Dr Engle, palli intent chemo planned. Pt requests 2nd op.      Genetic profiling of tumor specimens are pending per Onc's note 4/3/23:    \"Howie agreed to start palliative chemotherapy with FOLFIRINOX cycle 1 today. IV fluid day 3. See me for follow-up in 14 days with CBC, CMP, magnesium and for cycle 2 FOLFIRINOX. Reviewed side effects.    MSI, foundation 1, genetic consult to be arranged. Discussed living will on follow-up.\"        Will proceed to offer next available consult w/ Dr Chauhan who can provide 2nd opinion & discuss clinical trial information if applicable to this pt.       Stephen Mike RN  Oncology Nurse Navigator  [facility]  [phone]           " "  Last period was normal.  Current contraception: None   Pregnancy was unplanned- she and her boyfriend, Art Jaramillo, have discussed and they do not feel ready for a baby at this time- would like a termination of pregnancy   Her boyfriend accompanied her to clinic today  Current symptoms also include: nausea, fatigue.     Risk behaviors:    Tobacco use:  reports that she has never smoked. She uses smokeless tobacco.  Drug or alcohol use: no      Current Outpatient Medications:      ondansetron (ZOFRAN) 4 MG tablet, Take 1 tablet (4 mg) by mouth every 12 hours as needed for nausea, Disp: 30 tablet, Rfl: 0     escitalopram (LEXAPRO) 10 MG tablet, Take 1 tablet (10 mg) by mouth daily (Patient not taking: Reported on 2/11/2022), Disp: 90 tablet, Rfl: 0       Objective:     /60   Pulse 94   Temp 98.8  F (37.1  C) (Oral)   Resp 20   Ht 1.525 m (5' 0.04\")   Wt 45.4 kg (100 lb)   LMP 01/17/2022 (Approximate)   SpO2 98%   Breastfeeding No   BMI 19.50 kg/m    Gen:  A&A, NAD.    Lab Review  Results for orders placed or performed in visit on 02/11/22   HCG qualitative urine   Result Value Ref Range    hCG Urine Qualitative Positive (A) Negative            "

## 2023-06-21 ENCOUNTER — OFFICE VISIT (OUTPATIENT)
Dept: URGENT CARE | Facility: URGENT CARE | Age: 23
End: 2023-06-21
Payer: COMMERCIAL

## 2023-06-21 VITALS
RESPIRATION RATE: 16 BRPM | TEMPERATURE: 98.3 F | WEIGHT: 109 LBS | OXYGEN SATURATION: 98 % | SYSTOLIC BLOOD PRESSURE: 101 MMHG | BODY MASS INDEX: 22.02 KG/M2 | HEART RATE: 69 BPM | DIASTOLIC BLOOD PRESSURE: 60 MMHG

## 2023-06-21 DIAGNOSIS — N39.0 ACUTE UTI: Primary | ICD-10-CM

## 2023-06-21 DIAGNOSIS — R30.0 DYSURIA: ICD-10-CM

## 2023-06-21 LAB
ALBUMIN UR-MCNC: NEGATIVE MG/DL
APPEARANCE UR: CLEAR
BACTERIA #/AREA URNS HPF: ABNORMAL /HPF
BILIRUB UR QL STRIP: NEGATIVE
CLUE CELLS: ABNORMAL
COLOR UR AUTO: YELLOW
GLUCOSE UR STRIP-MCNC: NEGATIVE MG/DL
HGB UR QL STRIP: ABNORMAL
KETONES UR STRIP-MCNC: NEGATIVE MG/DL
LEUKOCYTE ESTERASE UR QL STRIP: ABNORMAL
NITRATE UR QL: NEGATIVE
PH UR STRIP: 6 [PH] (ref 5–7)
RBC #/AREA URNS AUTO: ABNORMAL /HPF
SP GR UR STRIP: <=1.005 (ref 1–1.03)
SQUAMOUS #/AREA URNS AUTO: ABNORMAL /LPF
TRICHOMONAS, WET PREP: ABNORMAL
UROBILINOGEN UR STRIP-ACNC: 0.2 E.U./DL
WBC #/AREA URNS AUTO: ABNORMAL /HPF
WBC CLUMPS #/AREA URNS HPF: PRESENT /HPF
WBC'S/HIGH POWER FIELD, WET PREP: ABNORMAL
YEAST, WET PREP: ABNORMAL

## 2023-06-21 PROCEDURE — 87186 SC STD MICRODIL/AGAR DIL: CPT | Performed by: PHYSICIAN ASSISTANT

## 2023-06-21 PROCEDURE — 99213 OFFICE O/P EST LOW 20 MIN: CPT | Performed by: PHYSICIAN ASSISTANT

## 2023-06-21 PROCEDURE — 81001 URINALYSIS AUTO W/SCOPE: CPT | Performed by: PHYSICIAN ASSISTANT

## 2023-06-21 PROCEDURE — 87088 URINE BACTERIA CULTURE: CPT | Performed by: PHYSICIAN ASSISTANT

## 2023-06-21 PROCEDURE — 87086 URINE CULTURE/COLONY COUNT: CPT | Performed by: PHYSICIAN ASSISTANT

## 2023-06-21 PROCEDURE — 87210 SMEAR WET MOUNT SALINE/INK: CPT | Performed by: PHYSICIAN ASSISTANT

## 2023-06-21 RX ORDER — PHENAZOPYRIDINE HYDROCHLORIDE 200 MG/1
200 TABLET, FILM COATED ORAL 3 TIMES DAILY PRN
Qty: 6 TABLET | Refills: 0 | Status: SHIPPED | OUTPATIENT
Start: 2023-06-21 | End: 2024-01-02

## 2023-06-21 RX ORDER — FLUCONAZOLE 150 MG/1
TABLET ORAL
Qty: 1 TABLET | Refills: 0 | Status: SHIPPED | OUTPATIENT
Start: 2023-06-21 | End: 2024-01-02

## 2023-06-21 RX ORDER — NITROFURANTOIN 25; 75 MG/1; MG/1
100 CAPSULE ORAL 2 TIMES DAILY
Qty: 14 CAPSULE | Refills: 0 | Status: SHIPPED | OUTPATIENT
Start: 2023-06-21 | End: 2023-06-28

## 2023-06-21 NOTE — PROGRESS NOTES
Patient presents with:  Urgent Care: UTI     (N39.0) Acute UTI  (primary encounter diagnosis)  Comment:   Plan: nitroFURantoin macrocrystal-monohydrate         (MACROBID) 100 MG capsule          Take 1 capsule twice a day for 7 days.    (R30.0) Dysuria  Comment:   Plan: UA Macroscopic with reflex to Microscopic and         Culture, Wet prep - Clinic Collect, Urine         Microscopic Exam, Urine Culture, fluconazole         (DIFLUCAN) 150 MG tablet          Take one fluconazole tablet today and one after you finish the antibiotic.       Take the pyridium 3 times a day for 2 days to help with the pain.  It will turn your urine orange.      If not improving or if condition worsens, follow up with your Primary Care Provider            SUBJECTIVE:   Param Polanco is a 23 year old female who presents today with dysuria onset 4 days ago with urinary frequency and urgency.  Some vaginal itching.      No sores.  Slight back discomfort and slight suprapubic pressure.  No fevers.      No past medical history on file.      Current Outpatient Medications   Medication Sig Dispense Refill     Multiple Vitamins-Iron (DAILY-KAREEN/IRON/BETA-CAROTENE) TABS TAKE 1 TABLET BY MOUTH DAILY. (Patient not taking: Reported on 10/19/2020) 30 tablet 7     Social History     Tobacco Use     Smoking status: Never Smoker     Smokeless tobacco: Never Used   Substance Use Topics     Alcohol use: Not on file     Family History   Problem Relation Age of Onset     Diabetes Mother      Diabetes Father          ROS:    10 point ROS of systems including Constitutional, Eyes, Respiratory, Cardiovascular, Gastroenterology, Genitourinary, Integumentary, Muscularskeletal, Psychiatric ,neurological were all negative except for pertinent positives noted in my HPI       OBJECTIVE:  /60   Pulse 69   Temp 98.3  F (36.8  C)   Resp 16   Wt 49.4 kg (109 lb)   SpO2 98%   BMI 22.02 kg/m    Physical Exam:  GENERAL APPEARANCE: healthy, alert and no  distress  ABDOMEN:  soft, nontender, no HSM or masses and bowel sounds normal  SKIN: no suspicious lesions or rashes  BACK: no CVAT    Results for orders placed or performed in visit on 06/21/23   UA Macroscopic with reflex to Microscopic and Culture     Status: Abnormal    Specimen: Urine, Clean Catch   Result Value Ref Range    Color Urine Yellow Colorless, Straw, Light Yellow, Yellow    Appearance Urine Clear Clear    Glucose Urine Negative Negative mg/dL    Bilirubin Urine Negative Negative    Ketones Urine Negative Negative mg/dL    Specific Gravity Urine <=1.005 1.003 - 1.035    Blood Urine Small (A) Negative    pH Urine 6.0 5.0 - 7.0    Protein Albumin Urine Negative Negative mg/dL    Urobilinogen Urine 0.2 0.2, 1.0 E.U./dL    Nitrite Urine Negative Negative    Leukocyte Esterase Urine Moderate (A) Negative   Urine Microscopic Exam     Status: Abnormal   Result Value Ref Range    Bacteria Urine Few (A) None Seen /HPF    RBC Urine 2-5 (A) 0-2 /HPF /HPF    WBC Urine 10-25 (A) 0-5 /HPF /HPF    Squamous Epithelials Urine Few (A) None Seen /LPF    WBC Clumps Urine Present (A) None Seen /HPF   Wet prep - Clinic Collect     Status: Abnormal    Specimen: Vagina; Swab   Result Value Ref Range    Trichomonas Absent Absent    Yeast Absent Absent    Clue Cells Absent Absent    WBCs/high power field 1+ (A) None

## 2023-06-21 NOTE — PATIENT INSTRUCTIONS
(N39.0) Acute UTI  (primary encounter diagnosis)  Comment:   Plan: nitroFURantoin macrocrystal-monohydrate         (MACROBID) 100 MG capsule          Take 1 capsule twice a day for 7 days.    (R30.0) Dysuria  Comment:   Plan: UA Macroscopic with reflex to Microscopic and         Culture, Wet prep - Clinic Collect, Urine         Microscopic Exam, Urine Culture, fluconazole         (DIFLUCAN) 150 MG tablet          Take one fluconazole tablet today and one after you finish the antibiotic.       Take the pyridium 3 times a day for 2 days to help with the pain.  It will turn your urine orange.

## 2023-06-22 LAB — BACTERIA UR CULT: ABNORMAL

## 2023-06-23 ENCOUNTER — TELEPHONE (OUTPATIENT)
Dept: URGENT CARE | Facility: URGENT CARE | Age: 23
End: 2023-06-23
Payer: COMMERCIAL

## 2023-06-23 DIAGNOSIS — N39.0 URINARY TRACT INFECTION WITH HEMATURIA, SITE UNSPECIFIED: Primary | ICD-10-CM

## 2023-06-23 DIAGNOSIS — R31.9 URINARY TRACT INFECTION WITH HEMATURIA, SITE UNSPECIFIED: Primary | ICD-10-CM

## 2023-06-23 RX ORDER — SULFAMETHOXAZOLE/TRIMETHOPRIM 800-160 MG
1 TABLET ORAL 2 TIMES DAILY
Qty: 6 TABLET | Refills: 0 | Status: SHIPPED | OUTPATIENT
Start: 2023-06-23 | End: 2023-06-26

## 2023-06-23 NOTE — TELEPHONE ENCOUNTER
Patient calling clinic back. RN relayed UC provider message to patient. Patient verbalized understanding and did not have any additional questions or concerns at this time.

## 2023-06-23 NOTE — TELEPHONE ENCOUNTER
Call and the patient sister answer the phone, forgot to ask the sister to tell the patient to call back, but the sister states that the patient will be back in about a few minute. Well call her back in a few minute.

## 2023-08-22 ENCOUNTER — TELEPHONE (OUTPATIENT)
Dept: FAMILY MEDICINE | Facility: CLINIC | Age: 23
End: 2023-08-22

## 2023-08-22 NOTE — TELEPHONE ENCOUNTER
Patient Quality Outreach    Patient is due for the following:   Cervical Cancer Screening - PAP Needed    Next Steps:   Patient has upcoming appointment, these items will be addressed at that time.    Type of outreach:    Chart review performed, no outreach needed.    Next Steps:  Reach out within 90 days via Phone.    Max number of attempts reached: No. Will try again in 90 days if patient still on fail list.    Questions for provider review:    None           Maxine Cohen  Chart routed to Care Team.

## 2024-01-02 ENCOUNTER — OFFICE VISIT (OUTPATIENT)
Dept: FAMILY MEDICINE | Facility: CLINIC | Age: 24
End: 2024-01-02
Payer: COMMERCIAL

## 2024-01-02 VITALS
SYSTOLIC BLOOD PRESSURE: 132 MMHG | HEIGHT: 60 IN | OXYGEN SATURATION: 98 % | DIASTOLIC BLOOD PRESSURE: 80 MMHG | RESPIRATION RATE: 16 BRPM | WEIGHT: 108.12 LBS | TEMPERATURE: 98 F | BODY MASS INDEX: 21.23 KG/M2 | HEART RATE: 80 BPM

## 2024-01-02 DIAGNOSIS — Z11.3 SCREENING FOR STDS (SEXUALLY TRANSMITTED DISEASES): ICD-10-CM

## 2024-01-02 DIAGNOSIS — R63.4 WEIGHT LOSS: ICD-10-CM

## 2024-01-02 DIAGNOSIS — F10.29 ALCOHOL DEPENDENCE WITH UNSPECIFIED ALCOHOL-INDUCED DISORDER (H): ICD-10-CM

## 2024-01-02 DIAGNOSIS — F33.1 MODERATE EPISODE OF RECURRENT MAJOR DEPRESSIVE DISORDER (H): ICD-10-CM

## 2024-01-02 DIAGNOSIS — Z12.4 CERVICAL CANCER SCREENING: Primary | ICD-10-CM

## 2024-01-02 DIAGNOSIS — Z59.9 FINANCIAL DIFFICULTIES: ICD-10-CM

## 2024-01-02 LAB
CLUE CELLS: ABNORMAL
ERYTHROCYTE [DISTWIDTH] IN BLOOD BY AUTOMATED COUNT: 12.3 % (ref 10–15)
HCT VFR BLD AUTO: 41.2 % (ref 35–47)
HGB BLD-MCNC: 13.8 G/DL (ref 11.7–15.7)
MCH RBC QN AUTO: 30.6 PG (ref 26.5–33)
MCHC RBC AUTO-ENTMCNC: 33.5 G/DL (ref 31.5–36.5)
MCV RBC AUTO: 91 FL (ref 78–100)
PLATELET # BLD AUTO: 216 10E3/UL (ref 150–450)
RBC # BLD AUTO: 4.51 10E6/UL (ref 3.8–5.2)
TRICHOMONAS, WET PREP: ABNORMAL
WBC # BLD AUTO: 6.3 10E3/UL (ref 4–11)
WBC'S/HIGH POWER FIELD, WET PREP: ABNORMAL
YEAST, WET PREP: ABNORMAL

## 2024-01-02 PROCEDURE — 36415 COLL VENOUS BLD VENIPUNCTURE: CPT | Performed by: FAMILY MEDICINE

## 2024-01-02 PROCEDURE — 85027 COMPLETE CBC AUTOMATED: CPT | Performed by: FAMILY MEDICINE

## 2024-01-02 PROCEDURE — 99215 OFFICE O/P EST HI 40 MIN: CPT | Performed by: FAMILY MEDICINE

## 2024-01-02 PROCEDURE — 80053 COMPREHEN METABOLIC PANEL: CPT | Performed by: FAMILY MEDICINE

## 2024-01-02 PROCEDURE — 82306 VITAMIN D 25 HYDROXY: CPT | Performed by: FAMILY MEDICINE

## 2024-01-02 PROCEDURE — 84443 ASSAY THYROID STIM HORMONE: CPT | Performed by: FAMILY MEDICINE

## 2024-01-02 PROCEDURE — G0145 SCR C/V CYTO,THINLAYER,RESCR: HCPCS | Performed by: FAMILY MEDICINE

## 2024-01-02 PROCEDURE — 87210 SMEAR WET MOUNT SALINE/INK: CPT | Performed by: FAMILY MEDICINE

## 2024-01-02 SDOH — ECONOMIC STABILITY - INCOME SECURITY: PROBLEM RELATED TO HOUSING AND ECONOMIC CIRCUMSTANCES, UNSPECIFIED: Z59.9

## 2024-01-02 ASSESSMENT — PATIENT HEALTH QUESTIONNAIRE - PHQ9
SUM OF ALL RESPONSES TO PHQ QUESTIONS 1-9: 19
SUM OF ALL RESPONSES TO PHQ QUESTIONS 1-9: 19
10. IF YOU CHECKED OFF ANY PROBLEMS, HOW DIFFICULT HAVE THESE PROBLEMS MADE IT FOR YOU TO DO YOUR WORK, TAKE CARE OF THINGS AT HOME, OR GET ALONG WITH OTHER PEOPLE: SOMEWHAT DIFFICULT

## 2024-01-02 NOTE — COMMUNITY RESOURCES LIST (ENGLISH)
01/02/2024   [facility]  N/A  For questions about this resource list or additional care needs, please contact your primary care clinic or care manager.  Phone: [phone]   Email: N/A   Address: [address] [place] [address]   Hours: N/A        Financial Stability       Rent and mortgage payment assistance  1  [facility] Distance: 2.28 miles      Phone/Virtual   [address] [place] [address]  Language: English  Hours: Mon - Fri 9:00 AM - 4:00 PM  Fees: Free   Phone: (996) 335-6282 Email: [email or web address] Website: [email or web address]/[facility]/     2  [facility] Distance: 2.3 miles      In-Person, Phone/Virtual   [address] [facility] [address] [place] [address]  Language: English, Moroccan  Hours: Mon - Fri 8:00 AM - 4:30 PM  Fees: Insurance   Phone: (599) 520-1842 Email: pamela@[employer] Website: https://[employer]App/          Food and Nutrition       Food pantry  3  [facility] Distance: 0.8 miles      In-Person   [address] [facility] [address] [place] [address]  Language: English  Hours: Mon - Fri 10:00 AM - 12:30 PM , Mon - Tue 1:30 PM - 3:30 PM , Thu - Fri 1:30 PM - 3:30 PM  Fees: Free   Phone: (811) 938-9430 Email: info@[employer] Website: http://[employer]/food-shelves/     4  [facility]oun[facility] Distance: 2 miles      Delivery, Pick   [address] [place] [address]  Language: English, Moroccan  Hours: Mon 9:00 AM - 6:00 PM Appt. Only, Tue - Fri 9:00 AM - 5:00 PM Appt. Only  Fees: Free   Phone: (237) 281-4358 Email: info@[employer].org Website: http://www.hallieqbrown.org/site     SNAP application assistance  5  [employer] [place] Distance: 1.58 miles      Phone/Virtual   [address] [address]  Language: English, Carrieong,  Japanese, Barbadian, Maltese  Hours: Mon - Fri 8:30 AM - 4:30 PM  Fees: Free   Phone: (782) 884-7066 Email: helpline@[employer].org Website: https://www.[employer]ers[employer].org/programs/[place]-food-helpline/     6  [place] [employer] [facility] [employer] (SNAP) Distance: 2.24 miles      Phone/Virtual   [address] [address] [place] [address]  Language: English, Hmong, Japanese, Barbadian, Maltese, French  Hours: Mon - Fri 9:00 AM - 5:00 PM  Fees: Free   Phone: (523) 883-5345 Website: https://[place].gov/dhs/people-we-serve/adults/economic-assistance/food-nutrition/programs-and-services/supplemental-nutrition-assistance-program.jsp     Soup kitchen or free meals  7  [facility] Distance: 2.28 miles      Pickup   [address] [place] [address]  Language: English  Hours: Mon - Fri 11:45 AM - 12:45 PM  Fees: Free   Phone: (386) 449-5170 Email: jake@[facility].org Website: http://[facility].org/[facility]/     8  [facility] [place] [facility] Distance: 2.29 miles      Pickup   [address] [place] [address]  Language: English  Hours: Wed 2:00 PM - 4:00 PM , Fri 2:00 PM - 4:00 PM  Fees: Free   Phone: (870) 962-9436 Email: Rizwana@.[facility]. Website: https://www.[facility]/facilities/[facility]          Important Numbers & Websites       Emergency Services   911  City Services   311  Poison Control   (921) 421-2104  Suicide Prevention Lifeline   (155) 114-4206 (TALK)  Child Abuse Hotline   (173) 578-9440 (4-A-Child)  Sexual Assault Hotline   (189) 827-3913 (HOPE)  National Runaway Safeline   (887) 558-5229 (RUNAWAY)  All-Options Talkline   (114) 356-9466  Substance Abuse Referral   (255) 671-2513 (HELP)

## 2024-01-02 NOTE — PROGRESS NOTES
Assessment & Plan     Alcohol dependence with unspecified alcohol-induced disorder (H)  Moderate episode of recurrent major depressive disorder (H)  She is open to talking to a therapist, is undecided on what she wants to do in terms of alcohol use treatment. Discussed medication options, AA, LDAC, etc. She has been struggling with this for 4 years, drinks to escape her depression, does not drink during the week. No thoughts of suicide/homicide. Also undecided on medications for depression, but ok with therapy referral. She has tried therapy before, did not get much benefit from it, but open to trying again - she was not using alcohol at that time.   - Adult Mental Health  Referral    Cervical cancer screening  - Pap Screen only - recommended age 21 - 24 years    Screening for STDs (sexually transmitted diseases)  - Chlamydia & Gonorrhea by PCR, GICH/Range - Clinic Collect  - Wet prep - Clinic Collect    Weight loss  Will check labs, but likely related to depression and alcohol use.   - CBC with platelets  - Comprehensive metabolic panel (BMP + Alb, Alk Phos, ALT, AST, Total. Bili, TP)  - TSH with free T4 reflex  - Vitamin D Deficiency  - CBC with platelets  - Comprehensive metabolic panel (BMP + Alb, Alk Phos, ALT, AST, Total. Bili, TP)  - TSH with free T4 reflex  - Vitamin D Deficiency    Need for community resources.   She lives on her own but struggles to pay rent, does not have consistent work. Unsure what resources she has, will refer to care coordination.     45 minutes spent on the date of the encounter doing chart review, history and exam, documentation and further activities per the note      Scot Armenta MD  [facility]gen is a 24 year old, presenting for the following health issues:  poor appetitte , Dizziness, and Tired  (Want to quite Vaping )      History of Present Illness       Reason for visit:  I having little energy and lost weight, and i needed  "help for quit vapeing and quiting alcohol.    She eats 0-1 servings of fruits and vegetables daily.She consumes 6 sweetened beverage(s) daily.She exercises with enough effort to increase her heart rate 9 or less minutes per day.  She exercises with enough effort to increase her heart rate 3 or less days per week. She is missing 7 dose(s) of medications per week.     Vaping A LOT. I'm not sure on resources for this?     3lb weight loss in 1 year, within normal range.   Patient used to be on lexapro but it made her feel like she was more depressed.   She drinks to escape the depression, started drinking around age 13 and felt like it was a problem for the past 4 years. She has wanted to stop for the past 4 years but has not asked anyone about it.     Vaping for a long time, she says like 15-20 cartridges. Says there's no tobacco or THC in the vape but she can't stop vaping.     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   She binge drinks on the weekends with her friends but sometimes drinks alone.   She feels sick, like she is losing weight, and just overall unwell.         Review of Systems   Constitutional, HEENT, cardiovascular, pulmonary, gi and gu systems are negative, except as otherwise noted.      Objective    /80   Pulse 80   Temp 98  F (36.7  C) (Oral)   Resp 16   Ht 1.534 m (5' 0.39\")   Wt 49 kg (108 lb 1.9 oz)   LMP 12/15/2023 (Approximate)   SpO2 98%   BMI 20.84 kg/m    Body mass index is 20.84 kg/m .  Physical Exam   GENERAL: healthy, alert and no distress  NECK: no adenopathy, no asymmetry, masses, or scars and thyroid normal to palpation  RESP: lungs clear to auscultation - no rales, rhonchi or wheezes  CV: regular rate and rhythm, normal S1 S2, no S3 or S4, no murmur, click or rub, no peripheral edema and peripheral pulses strong  ABDOMEN: soft, nontender, no hepatosplenomegaly, no masses and bowel sounds normal    (female): External genitalia is without " lesions. Introitus is normal, vaginal walls pink and moist without lesions or evidence of trauma. No cervical motion tenderness. No adnexal masses. No cervical lesions or discharge  MS: no gross musculoskeletal defects noted, no edema    Results for orders placed or performed in visit on 01/02/24   CBC with platelets     Status: Normal   Result Value Ref Range    WBC Count 6.3 4.0 - 11.0 10e3/uL    RBC Count 4.51 3.80 - 5.20 10e6/uL    Hemoglobin 13.8 11.7 - 15.7 g/dL    Hematocrit 41.2 35.0 - 47.0 %    MCV 91 78 - 100 fL    MCH 30.6 26.5 - 33.0 pg    MCHC 33.5 31.5 - 36.5 g/dL    RDW 12.3 10.0 - 15.0 %    Platelet Count 216 150 - 450 10e3/uL   Wet prep - Clinic Collect     Status: Abnormal    Specimen: Vagina; Swab   Result Value Ref Range    Trichomonas Absent Absent    Yeast Absent Absent    Clue Cells Absent Absent    WBCs/high power field 1+ (A) None     Results for orders placed or performed in visit on 01/02/24 (from the past 24 hour(s))   CBC with platelets   Result Value Ref Range    WBC Count 6.3 4.0 - 11.0 10e3/uL    RBC Count 4.51 3.80 - 5.20 10e6/uL    Hemoglobin 13.8 11.7 - 15.7 g/dL    Hematocrit 41.2 35.0 - 47.0 %    MCV 91 78 - 100 fL    MCH 30.6 26.5 - 33.0 pg    MCHC 33.5 31.5 - 36.5 g/dL    RDW 12.3 10.0 - 15.0 %    Platelet Count 216 150 - 450 10e3/uL   Wet prep - Clinic Collect    Specimen: Vagina; Swab   Result Value Ref Range    Trichomonas Absent Absent    Yeast Absent Absent    Clue Cells Absent Absent    WBCs/high power field 1+ (A) None

## 2024-01-02 NOTE — Clinical Note
FYI, this patient needs a new PCP, I'm having her see you. Nice kid, binge drinks due to depression. Undecided what she wants to do, but I did some labs and talked to her about options. Quite the history...

## 2024-01-02 NOTE — LETTER
January 2, 2024      Param Polanco  [address]   [place] [address]        To Whom It May Concern:    Param Polanco was seen in our clinic. She may return to work without restrictions. Please excuse her absence for this visit.     Call us with any questions or concerns      Sincerely,            Scot Armenta MD

## 2024-01-03 ENCOUNTER — PATIENT OUTREACH (OUTPATIENT)
Dept: CARE COORDINATION | Facility: CLINIC | Age: 24
End: 2024-01-03
Payer: COMMERCIAL

## 2024-01-03 LAB
ALBUMIN SERPL BCG-MCNC: 4.4 G/DL (ref 3.5–5.2)
ALP SERPL-CCNC: 114 U/L (ref 40–150)
ALT SERPL W P-5'-P-CCNC: 14 U/L (ref 0–50)
ANION GAP SERPL CALCULATED.3IONS-SCNC: 8 MMOL/L (ref 7–15)
AST SERPL W P-5'-P-CCNC: 19 U/L (ref 0–45)
BILIRUB SERPL-MCNC: 0.7 MG/DL
BUN SERPL-MCNC: 8.9 MG/DL (ref 6–20)
CALCIUM SERPL-MCNC: 9.3 MG/DL (ref 8.6–10)
CHLORIDE SERPL-SCNC: 107 MMOL/L (ref 98–107)
CREAT SERPL-MCNC: 0.67 MG/DL (ref 0.51–0.95)
DEPRECATED HCO3 PLAS-SCNC: 25 MMOL/L (ref 22–29)
EGFRCR SERPLBLD CKD-EPI 2021: >90 ML/MIN/1.73M2
GLUCOSE SERPL-MCNC: 90 MG/DL (ref 70–99)
POTASSIUM SERPL-SCNC: 4.2 MMOL/L (ref 3.4–5.3)
PROT SERPL-MCNC: 7.3 G/DL (ref 6.4–8.3)
SODIUM SERPL-SCNC: 140 MMOL/L (ref 135–145)
TSH SERPL DL<=0.005 MIU/L-ACNC: 4.07 UIU/ML (ref 0.3–4.2)
VIT D+METAB SERPL-MCNC: 8 NG/ML (ref 20–50)

## 2024-01-03 NOTE — PROGRESS NOTES
Clinic Care Coordination Contact  [facility]    Clinical Data: Care Coordinator Outreach    Outreach Documentation Number of Outreach Attempt   1/3/2024   9:38 AM 1     CHW attempted to call patient multiple times, all went straight to voicemail and left a message for patient with details and request a call back.    Plan: Care Coordinator will try to reach patient again in 1-2 business days.

## 2024-01-04 ENCOUNTER — PATIENT OUTREACH (OUTPATIENT)
Dept: CARE COORDINATION | Facility: CLINIC | Age: 24
End: 2024-01-04
Payer: COMMERCIAL

## 2024-01-04 LAB
BKR LAB AP GYN ADEQUACY: NORMAL
BKR LAB AP GYN INTERPRETATION: NORMAL
BKR LAB AP HPV REFLEX: NORMAL
BKR LAB AP LMP: NORMAL
BKR LAB AP PREVIOUS ABNORMAL: NORMAL
PATH REPORT.COMMENTS IMP SPEC: NORMAL
PATH REPORT.COMMENTS IMP SPEC: NORMAL
PATH REPORT.RELEVANT HX SPEC: NORMAL

## 2024-01-04 NOTE — PROGRESS NOTES
Clinic Care Coordination Contact  [facility]/Voicemail    Clinical Data: Care Coordinator Outreach    Outreach Documentation Number of Outreach Attempt   1/3/2024   9:38 AM 1   1/4/2024   1:40 PM 2     Left message on patient's voicemail with call back information and requested return call.    Plan: Care Coordinator will send care coordination introduction letter with care coordinator contact information and explanation of care coordination services via mail. Care Coordinator will do no further outreaches at this time. PCP feel free to place new referral if need(s) identify.

## 2024-05-20 ENCOUNTER — OFFICE VISIT (OUTPATIENT)
Dept: FAMILY MEDICINE | Facility: CLINIC | Age: 24
End: 2024-05-20
Payer: COMMERCIAL

## 2024-05-20 VITALS
BODY MASS INDEX: 21.69 KG/M2 | SYSTOLIC BLOOD PRESSURE: 108 MMHG | RESPIRATION RATE: 16 BRPM | OXYGEN SATURATION: 99 % | HEART RATE: 63 BPM | HEIGHT: 60 IN | WEIGHT: 110.5 LBS | DIASTOLIC BLOOD PRESSURE: 72 MMHG | TEMPERATURE: 98.4 F

## 2024-05-20 DIAGNOSIS — L70.0 ACNE VULGARIS: Primary | ICD-10-CM

## 2024-05-20 PROCEDURE — 99213 OFFICE O/P EST LOW 20 MIN: CPT | Performed by: FAMILY MEDICINE

## 2024-05-20 RX ORDER — CLINDAMYCIN PHOSPHATE 10 MG/G
GEL TOPICAL DAILY
Qty: 75 ML | Refills: 5 | Status: SHIPPED | OUTPATIENT
Start: 2024-05-20

## 2024-05-20 RX ORDER — CLINDAMYCIN AND BENZOYL PEROXIDE 10; 50 MG/G; MG/G
GEL TOPICAL 2 TIMES DAILY
Qty: 50 G | Refills: 11 | Status: CANCELLED | OUTPATIENT
Start: 2024-05-20

## 2024-05-20 ASSESSMENT — PATIENT HEALTH QUESTIONNAIRE - PHQ9
10. IF YOU CHECKED OFF ANY PROBLEMS, HOW DIFFICULT HAVE THESE PROBLEMS MADE IT FOR YOU TO DO YOUR WORK, TAKE CARE OF THINGS AT HOME, OR GET ALONG WITH OTHER PEOPLE: SOMEWHAT DIFFICULT
SUM OF ALL RESPONSES TO PHQ QUESTIONS 1-9: 12
SUM OF ALL RESPONSES TO PHQ QUESTIONS 1-9: 12

## 2024-05-20 NOTE — PROGRESS NOTES
"  Assessment & Plan     Acne vulgaris  Continue benzoyl peroxide wash nightly and add clindamycin gel at night. Discussed what to expect w acne treatment - may see worsening before improvement, but stick w the regimen and should have notable improvement in 2-3 months. If not sufficient improvement, would consider oral antibiotic at that time. Patient is scheduled for physical in August, and will follow up for acne med check at that time - but return sooner if problems or concerns.   - clindamycin (CLINDAGEL) 1 % external gel  Dispense: 75 mL; Refill: 5                  Subjective   Smu is a 24 year old, presenting for the following health issues:  Acne        5/20/2024    12:15 PM   Additional Questions   Roomed by Amna PARSON   Accompanied by mother Zeina Guillen     History of Present Illness       Reason for visit:  Acne    Already using benzoyl peroxide wash on face chest and upper back. It is helping but not enough. Has never been on rxs for acne.     She has a physical scheduled for August but here w mom today for mom's appt. Mom asks if acne can be addressed, so patient was added to schedule.                  Objective    /72   Pulse 63   Temp 98.4  F (36.9  C) (Oral)   Resp 16   Ht 1.535 m (5' 0.43\")   Wt 50.1 kg (110 lb 8 oz)   LMP 04/26/2024 (Exact Date)   SpO2 99%   BMI 21.27 kg/m    Body mass index is 21.27 kg/m .  Physical Exam   Gen: pleasant, well appearing, NAD  Skin: On face, upper back, upper chest - scattered open and closed comedones, only a few with minimal inflammation. No cystic lesions, but she does have old scarring.               Signed Electronically by: Rosa M Jose MD    "

## 2024-07-17 ENCOUNTER — NURSE TRIAGE (OUTPATIENT)
Dept: FAMILY MEDICINE | Facility: CLINIC | Age: 24
End: 2024-07-17
Payer: COMMERCIAL

## 2024-07-17 ENCOUNTER — HOSPITAL ENCOUNTER (EMERGENCY)
Facility: HOSPITAL | Age: 24
Discharge: HOME | End: 2024-07-17
Attending: EMERGENCY MEDICINE | Admitting: EMERGENCY MEDICINE
Payer: COMMERCIAL

## 2024-07-17 VITALS
HEART RATE: 90 BPM | OXYGEN SATURATION: 99 % | RESPIRATION RATE: 20 BRPM | SYSTOLIC BLOOD PRESSURE: 109 MMHG | DIASTOLIC BLOOD PRESSURE: 65 MMHG | TEMPERATURE: 98 F

## 2024-07-17 DIAGNOSIS — K64.4 EXTERNAL HEMORRHOIDS: ICD-10-CM

## 2024-07-17 DIAGNOSIS — K62.5 RECTAL BLEEDING: ICD-10-CM

## 2024-07-17 LAB
ALBUMIN SERPL BCG-MCNC: 4.2 G/DL (ref 3.5–5.2)
ALP SERPL-CCNC: 94 U/L (ref 40–150)
ALT SERPL W P-5'-P-CCNC: 10 U/L (ref 0–50)
ANION GAP SERPL CALCULATED.3IONS-SCNC: 10 MMOL/L (ref 7–15)
AST SERPL W P-5'-P-CCNC: 14 U/L (ref 0–45)
BASOPHILS # BLD AUTO: 0 10E3/UL (ref 0–0.2)
BASOPHILS NFR BLD AUTO: 1 %
BILIRUB DIRECT SERPL-MCNC: <0.2 MG/DL (ref 0–0.3)
BILIRUB SERPL-MCNC: 0.6 MG/DL
BUN SERPL-MCNC: 10.2 MG/DL (ref 6–20)
CALCIUM SERPL-MCNC: 8.7 MG/DL (ref 8.8–10.4)
CHLORIDE SERPL-SCNC: 104 MMOL/L (ref 98–107)
CREAT SERPL-MCNC: 0.77 MG/DL (ref 0.51–0.95)
EGFRCR SERPLBLD CKD-EPI 2021: >90 ML/MIN/1.73M2
EOSINOPHIL # BLD AUTO: 0.1 10E3/UL (ref 0–0.7)
EOSINOPHIL NFR BLD AUTO: 1 %
ERYTHROCYTE [DISTWIDTH] IN BLOOD BY AUTOMATED COUNT: 12.2 % (ref 10–15)
GLUCOSE SERPL-MCNC: 93 MG/DL (ref 70–99)
HCG SERPL QL: NEGATIVE
HCO3 SERPL-SCNC: 23 MMOL/L (ref 22–29)
HCT VFR BLD AUTO: 41.1 % (ref 35–47)
HGB BLD-MCNC: 13.5 G/DL (ref 11.7–15.7)
IMM GRANULOCYTES # BLD: 0 10E3/UL
IMM GRANULOCYTES NFR BLD: 0 %
INR PPP: 1.02 (ref 0.85–1.15)
LYMPHOCYTES # BLD AUTO: 2.3 10E3/UL (ref 0.8–5.3)
LYMPHOCYTES NFR BLD AUTO: 27 %
MCH RBC QN AUTO: 30.3 PG (ref 26.5–33)
MCHC RBC AUTO-ENTMCNC: 32.8 G/DL (ref 31.5–36.5)
MCV RBC AUTO: 92 FL (ref 78–100)
MONOCYTES # BLD AUTO: 0.7 10E3/UL (ref 0–1.3)
MONOCYTES NFR BLD AUTO: 9 %
NEUTROPHILS # BLD AUTO: 5.3 10E3/UL (ref 1.6–8.3)
NEUTROPHILS NFR BLD AUTO: 63 %
NRBC # BLD AUTO: 0 10E3/UL
NRBC BLD AUTO-RTO: 0 /100
PLATELET # BLD AUTO: 204 10E3/UL (ref 150–450)
POTASSIUM SERPL-SCNC: 3.9 MMOL/L (ref 3.4–5.3)
PROT SERPL-MCNC: 7.2 G/DL (ref 6.4–8.3)
RBC # BLD AUTO: 4.45 10E6/UL (ref 3.8–5.2)
SODIUM SERPL-SCNC: 137 MMOL/L (ref 135–145)
WBC # BLD AUTO: 8.4 10E3/UL (ref 4–11)

## 2024-07-17 PROCEDURE — 85610 PROTHROMBIN TIME: CPT | Performed by: EMERGENCY MEDICINE

## 2024-07-17 PROCEDURE — 82040 ASSAY OF SERUM ALBUMIN: CPT | Performed by: EMERGENCY MEDICINE

## 2024-07-17 PROCEDURE — 80048 BASIC METABOLIC PNL TOTAL CA: CPT | Performed by: EMERGENCY MEDICINE

## 2024-07-17 PROCEDURE — 84703 CHORIONIC GONADOTROPIN ASSAY: CPT | Performed by: EMERGENCY MEDICINE

## 2024-07-17 PROCEDURE — 99283 EMERGENCY DEPT VISIT LOW MDM: CPT

## 2024-07-17 PROCEDURE — 36415 COLL VENOUS BLD VENIPUNCTURE: CPT | Performed by: EMERGENCY MEDICINE

## 2024-07-17 PROCEDURE — 85025 COMPLETE CBC W/AUTO DIFF WBC: CPT | Performed by: EMERGENCY MEDICINE

## 2024-07-17 RX ORDER — POLYETHYLENE GLYCOL 3350 17 G/17G
1 POWDER, FOR SOLUTION ORAL 2 TIMES DAILY
Qty: 510 G | Refills: 0 | Status: SHIPPED | OUTPATIENT
Start: 2024-07-17

## 2024-07-17 ASSESSMENT — ENCOUNTER SYMPTOMS
RECTAL PAIN: 0
BLOOD IN STOOL: 1
ABDOMINAL PAIN: 0

## 2024-07-17 ASSESSMENT — COLUMBIA-SUICIDE SEVERITY RATING SCALE - C-SSRS
2. HAVE YOU ACTUALLY HAD ANY THOUGHTS OF KILLING YOURSELF IN THE PAST MONTH?: NO
6. HAVE YOU EVER DONE ANYTHING, STARTED TO DO ANYTHING, OR PREPARED TO DO ANYTHING TO END YOUR LIFE?: NO
1. IN THE PAST MONTH, HAVE YOU WISHED YOU WERE DEAD OR WISHED YOU COULD GO TO SLEEP AND NOT WAKE UP?: NO

## 2024-07-17 ASSESSMENT — ACTIVITIES OF DAILY LIVING (ADL)
ADLS_ACUITY_SCORE: 35
ADLS_ACUITY_SCORE: 35

## 2024-07-17 NOTE — TELEPHONE ENCOUNTER
"Care Advice:  To to ED now    Disposition:  Patient will follow disposition.    Armando Major RN  [facility]      Reason for Disposition   Black or tarry bowel movements   Black or tarry bowel movements  (Exception: Chronic-unchanged black-grey BMs AND is taking iron pills or Pepto-Bismol.)    Additional Information   Negative: Shock suspected (e.g., cold/pale/clammy skin, too weak to stand, low BP, rapid pulse)   Negative: Difficult to awaken or acting confused (e.g., disoriented, slurred speech)   Negative: Passed out (i.e., lost consciousness, collapsed and was not responding)   Negative: [1] Vomiting AND [2] contains red blood or black (\"coffee ground\") material  (Exception: Few red streaks in vomit that only happened once.)   Negative: Sounds like a life-threatening emergency to the triager    Answer Assessment - Initial Assessment Questions  1. COLOR: \"What color is it?\" \"Is that color in part or all of the stool?\"      Patient reported each time she poop, saw bright red blood with stool. Patient reported pooping every 2-3 days.  Stools are hard when pooping.   Patient reported no hemorrhoids were present.     2. ONSET: \"When was the unusual color first noted?\"      For two weeks now.  Noticed blood with stool at each void.    3. CAUSE: \"Have you eaten any food or taken any medicine of this color?\" Note: See listing in Background Information section.       No.    4. OTHER SYMPTOMS: \"Do you have any other symptoms?\" (e.g., abdomen pain, diarrhea, jaundice, fever).      Constipation, passing gas, some abdominal pain present.  Denied all other symptoms.    Answer Assessment - Initial Assessment Questions  1. APPEARANCE of BLOOD: \"What color is it?\" \"Is it passed separately, on the surface of the stool, or mixed in with the stool?\"       Dark to bright red blood mixed with stool for the past two weeks.    2. AMOUNT: \"How much blood was passed?\"       A lot    3. FREQUENCY: \"How many times has " "blood been passed with the stools?\"       With each passing stool. Patient reported constipation is involved and void approximately 2-3 times weekly.    4. ONSET: \"When was the blood first seen in the stools?\" (Days or weeks)       Two weeks ago.    5. DIARRHEA: \"Is there also some diarrhea?\" If Yes, ask: \"How many diarrhea stools in the past 24 hours?\"       Denied    6. CONSTIPATION: \"Do you have constipation?\" If Yes, ask: \"How bad is it?\"      Yes.  Void 2-3 times a week.    7. RECURRENT SYMPTOMS: \"Have you had blood in your stools before?\" If Yes, ask: \"When was the last time?\" and \"What happened that time?\"       No    8. BLOOD THINNERS: \"Do you take any blood thinners?\" (e.g., Coumadin/warfarin, Pradaxa/dabigatran, aspirin)      No    9. OTHER SYMPTOMS: \"Do you have any other symptoms?\"  (e.g., abdomen pain, vomiting, dizziness, fever)      Abdominal pain and constipation.    10. PREGNANCY: \"Is there any chance you are pregnant?\" \"When was your last menstrual period?\"        No.  LMP: 6/22/24    Protocols used: Stools - Unusual Color-A-AH, Rectal Bleeding-A-AH    "

## 2024-07-17 NOTE — ED PROVIDER NOTES
EMERGENCY DEPARTMENT ENCOUNTER      NAME: Param Polanco  AGE: 24 year old female  YOB: 2000  MRN: [id number]  EVALUATION DATE & TIME: 2024  6:35 PM    PCP: No Ref-Primary, Physician    ED PROVIDER: Rylan Flores MD      Chief Complaint   Patient presents with    Rectal Bleeding         FINAL IMPRESSION:  1. Rectal bleeding    2. External hemorrhoids          ED COURSE & MEDICAL DECISION MAKIN:43 PM I met patient and performed my evaluation.   7:56 PM I performed rectal exam on patient with Tamra SandovalRN supervising.   Pertinent Labs & Imaging studies reviewed. (See chart for details)  24 year old female presents to the Emergency Department for evaluation of bright red blood per rectum intermittently for few weeks.    Reviewed triage nurse note from today in Care Everywhere    Intermittent bright red blood per rectum    Exam external hemorrhoid.  On internal rectal exam has small amount of bright red blood per rectum    INR, BMP, [place] function panel, pregnancy test, CBC reassuring    Highly doubt hemorrhage.  I do not feel CT imaging is indicated    Patient reports constipation.  Likely internal hemorrhoid causing some bright red blood per rectum.  Recommend MiraLAX and follow-up primary care doctor for consideration of colonoscopy to exclude cancer           Medical Decision Making  Obtained supplemental history:Supplemental history obtained?: Documented in chart  Reviewed external records: External records reviewed?: Documented in chart and Outpatient Record: Patient was seen at [facility] on 24.   Care impacted by chronic illness:Mental Health and Other: Irregular menses and insomnia  Care significantly affected by social determinants of health:N/A  Did you consider but not order tests?: Work up considered but not performed and documented in chart, if applicable  Did you interpret images independently?: Independent interpretation of ECG and images noted in documentation,  when applicable.  Consultation discussion with other provider:Did you involve another provider (consultant, , pharmacy, etc.)?: No  Discharge. I prescribed additional prescription strength medication(s) as charted. N/A.    At the conclusion of the encounter I discussed the results of all of the tests and the disposition. The questions were answered. The patient or family acknowledged understanding and was agreeable with the care plan.         MEDICATIONS GIVEN IN THE EMERGENCY:  Medications - No data to display    NEW PRESCRIPTIONS STARTED AT TODAY'S ER VISIT  Discharge Medication List as of 7/17/2024  8:05 PM        START taking these medications    Details   polyethylene glycol (MIRALAX) 17 GM/Dose powder Take 17 g (1 Capful) by mouth 2 times daily, Disp-510 g, R-0, Local Print                =================================================================    HPI    Patient information was obtained from: Patient     Use of : Language: Sara Polanco is a 24 year old female with a pertinent history of Irregular menses and insomnia who presents to this ED via walk-in for evaluation of rectal bleeding.     The patient reports she has been having hematochezia for the past month. She was told by her PCP triage nurse to come into the ED. Patient reports pain with bowel movement. Her last bowel movement was today. She does not have daily bowel movement. Patient denies any pain with the bleeding, but the bleeding has increased for the past 2 weeks.She does not take daily medication. Denies any abdominal or rectal pain. No other complaints at this time.  History of constipation.  Only stools every few days      REVIEW OF SYSTEMS   Review of Systems   Gastrointestinal:  Positive for blood in stool. Negative for abdominal pain and rectal pain.        PAST MEDICAL HISTORY:  History reviewed. No pertinent past medical history.    PAST SURGICAL HISTORY:  No past surgical history on file.        CURRENT  MEDICATIONS:    polyethylene glycol (MIRALAX) 17 GM/Dose powder  clindamycin (CLINDAGEL) 1 % external gel        ALLERGIES:  No Known Allergies    FAMILY HISTORY:  No family history on file.    SOCIAL HISTORY:   Social History     Socioeconomic History    Marital status: Single   Tobacco Use    Smoking status: Never     Passive exposure: Current    Smokeless tobacco: Never   Vaping Use    Vaping status: Every Day    Substances: Nicotine, Flavoring    Devices: Disposable, Pre-filled pod   Substance and Sexual Activity    Alcohol use: No    Drug use: No    Sexual activity: Yes     Partners: Male     Birth control/protection: None     Social Determinants of Health     Financial Resource Strain: Low Risk  (1/2/2024)    Financial Resource Strain     Within the past 12 months, have you or your family members you live with been unable to get utilities (heat, electricity) when it was really needed?: No   Food Insecurity: High Risk (1/2/2024)    Food Insecurity     Within the past 12 months, did you worry that your food would run out before you got money to buy more?: Yes     Within the past 12 months, did the food you bought just not last and you didn t have money to get more?: Patient declined   Transportation Needs: Low Risk  (1/2/2024)    Transportation Needs     Within the past 12 months, has lack of transportation kept you from medical appointments, getting your medicines, non-medical meetings or appointments, work, or from getting things that you need?: No   Physical Activity: Insufficiently Active (11/2/2021)    Exercise Vital Sign     Days of Exercise per Week: 1 day     Minutes of Exercise per Session: 10 min   Stress: Stress Concern Present (2/14/2022)    Citizen of the [place] of Occupational Health - Occupational Stress Questionnaire     Feeling of Stress : To some extent   Social Connections: Moderately Isolated (2/14/2022)    Social Connection and Isolation Panel [NHANES]     Frequency of Communication with Friends  and Family: More than three times a week     Frequency of Social Gatherings with Friends and Family: More than three times a week     Attends Taoism Services: Never     Active Member of Clubs or Organizations: No     Attends Club or Organization Meetings: Never     Marital Status: Living with partner   Interpersonal Safety: Low Risk  (1/2/2024)    Interpersonal Safety     Do you feel physically and emotionally safe where you currently live?: Yes     Within the past 12 months, have you been hit, slapped, kicked or otherwise physically hurt by someone?: No     Within the past 12 months, have you been humiliated or emotionally abused in other ways by your partner or ex-partner?: No   Housing Stability: High Risk (1/2/2024)    Housing Stability     Do you have housing? : Yes     Are you worried about losing your housing?: Yes       VITALS:  /78   Pulse 79   Temp 98  F (36.7  C) (Temporal)   Resp 20   LMP 04/26/2024 (Exact Date)   SpO2 100%     PHYSICAL EXAM      Vitals: /78   Pulse 79   Temp 98  F (36.7  C) (Temporal)   Resp 20   LMP 04/26/2024 (Exact Date)   SpO2 100%   General: Appears in no acute distress, awake, alert, interactive.  Eyes: Conjunctivae non-injected. Sclera anicteric.  HENT: Atraumatic.  Neck: Supple.  Respiratory/Chest: Respiration unlabored.  Abdomen: non distended  Musculoskeletal: Normal extremities. No edema or erythema.  Rectal: External hemorrhoid nontender.  No fissure. patient has a very small amount of bright red blood on the internal rectal exam.  Tamra nurse present  Neurologic: Face symmetric, moves all extremities spontaneously. Speech clear.  Psychiatric: Oriented to person, place, and time. Affect appropriate.      LAB:  All pertinent labs reviewed and interpreted.  Results for orders placed or performed during the hospital encounter of 07/17/24   Basic metabolic panel   Result Value Ref Range    Sodium 137 135 - 145 mmol/L    Potassium 3.9 3.4 - 5.3 mmol/L     Chloride 104 98 - 107 mmol/L    Carbon Dioxide (CO2) 23 22 - 29 mmol/L    Anion Gap 10 7 - 15 mmol/L    Urea Nitrogen 10.2 6.0 - 20.0 mg/dL    Creatinine 0.77 0.51 - 0.95 mg/dL    GFR Estimate >90 >60 mL/min/1.73m2    Calcium 8.7 (L) 8.8 - 10.4 mg/dL    Glucose 93 70 - 99 mg/dL   HCG QUALitative pregnancy (blood)   Result Value Ref Range    hCG Serum Qualitative Negative Negative   CBC with platelets and differential   Result Value Ref Range    WBC Count 8.4 4.0 - 11.0 10e3/uL    RBC Count 4.45 3.80 - 5.20 10e6/uL    Hemoglobin 13.5 11.7 - 15.7 g/dL    Hematocrit 41.1 35.0 - 47.0 %    MCV 92 78 - 100 fL    MCH 30.3 26.5 - 33.0 pg    MCHC 32.8 31.5 - 36.5 g/dL    RDW 12.2 10.0 - 15.0 %    Platelet Count 204 150 - 450 10e3/uL    % Neutrophils 63 %    % Lymphocytes 27 %    % Monocytes 9 %    % Eosinophils 1 %    % Basophils 1 %    % Immature Granulocytes 0 %    NRBCs per 100 WBC 0 <1 /100    Absolute Neutrophils 5.3 1.6 - 8.3 10e3/uL    Absolute Lymphocytes 2.3 0.8 - 5.3 10e3/uL    Absolute Monocytes 0.7 0.0 - 1.3 10e3/uL    Absolute Eosinophils 0.1 0.0 - 0.7 10e3/uL    Absolute Basophils 0.0 0.0 - 0.2 10e3/uL    Absolute Immature Granulocytes 0.0 <=0.4 10e3/uL    Absolute NRBCs 0.0 10e3/uL   Result Value Ref Range    INR 1.02 0.85 - 1.15   Hepatic function panel   Result Value Ref Range    Protein Total 7.2 6.4 - 8.3 g/dL    Albumin 4.2 3.5 - 5.2 g/dL    Bilirubin Total 0.6 <=1.2 mg/dL    Alkaline Phosphatase 94 40 - 150 U/L    AST 14 0 - 45 U/L    ALT 10 0 - 50 U/L    Bilirubin Direct <0.20 0.00 - 0.30 mg/dL       RADIOLOGY:  Reviewed all pertinent imaging. Please see official radiology report.  No orders to display             I,Let Let, am serving as a scribe to document services personally performed by Rylan Flores MD based on my observation and the provider's statements to me. I, Rylan Flores MD, attest that Rhonda Ellis is acting in a scribe capacity, has observed my performance of the services and has documented  them in accordance with my direction.    Rylan Flores MD  [facility] EMERGENCY DEPARTMENT  [address] [place] [id number] [phone]      Rylan Flores MD  07/17/24 2019

## 2024-07-18 ENCOUNTER — PATIENT OUTREACH (OUTPATIENT)
Dept: FAMILY MEDICINE | Facility: CLINIC | Age: 24
End: 2024-07-18
Payer: COMMERCIAL

## 2025-01-11 ENCOUNTER — HOSPITAL ENCOUNTER (EMERGENCY)
Facility: HOSPITAL | Age: 25
Discharge: HOME | End: 2025-01-11
Payer: COMMERCIAL

## 2025-01-11 ENCOUNTER — APPOINTMENT (OUTPATIENT)
Dept: RADIOLOGY | Facility: HOSPITAL | Age: 25
End: 2025-01-11
Payer: COMMERCIAL

## 2025-01-11 VITALS
SYSTOLIC BLOOD PRESSURE: 125 MMHG | OXYGEN SATURATION: 97 % | HEART RATE: 79 BPM | DIASTOLIC BLOOD PRESSURE: 85 MMHG | RESPIRATION RATE: 16 BRPM | TEMPERATURE: 98.7 F | BODY MASS INDEX: 23.4 KG/M2 | HEIGHT: 60 IN | WEIGHT: 119.2 LBS

## 2025-01-11 DIAGNOSIS — B97.89 ACUTE VIRAL TONSILLITIS: ICD-10-CM

## 2025-01-11 DIAGNOSIS — J03.80 ACUTE VIRAL TONSILLITIS: ICD-10-CM

## 2025-01-11 LAB
FLUAV RNA SPEC QL NAA+PROBE: NEGATIVE
FLUBV RNA RESP QL NAA+PROBE: NEGATIVE
RSV RNA SPEC NAA+PROBE: NEGATIVE
S PYO DNA THROAT QL NAA+PROBE: NOT DETECTED
SARS-COV-2 RNA RESP QL NAA+PROBE: NEGATIVE

## 2025-01-11 PROCEDURE — 250N000011 HC RX IP 250 OP 636

## 2025-01-11 PROCEDURE — 71046 X-RAY EXAM CHEST 2 VIEWS: CPT

## 2025-01-11 PROCEDURE — 96372 THER/PROPH/DIAG INJ SC/IM: CPT

## 2025-01-11 PROCEDURE — 87637 SARSCOV2&INF A&B&RSV AMP PRB: CPT | Performed by: STUDENT IN AN ORGANIZED HEALTH CARE EDUCATION/TRAINING PROGRAM

## 2025-01-11 PROCEDURE — 99284 EMERGENCY DEPT VISIT MOD MDM: CPT | Mod: 25

## 2025-01-11 PROCEDURE — 87651 STREP A DNA AMP PROBE: CPT | Performed by: STUDENT IN AN ORGANIZED HEALTH CARE EDUCATION/TRAINING PROGRAM

## 2025-01-11 RX ORDER — LIDOCAINE HYDROCHLORIDE 20 MG/ML
15 SOLUTION OROPHARYNGEAL EVERY 4 HOURS PRN
Qty: 100 ML | Refills: 0 | Status: SHIPPED | OUTPATIENT
Start: 2025-01-11

## 2025-01-11 RX ORDER — BENZONATATE 100 MG/1
100 CAPSULE ORAL 3 TIMES DAILY PRN
Qty: 20 CAPSULE | Refills: 0 | Status: SHIPPED | OUTPATIENT
Start: 2025-01-11

## 2025-01-11 RX ORDER — DEXAMETHASONE SODIUM PHOSPHATE 10 MG/ML
10 INJECTION, SOLUTION INTRAMUSCULAR; INTRAVENOUS ONCE
Status: COMPLETED | OUTPATIENT
Start: 2025-01-11 | End: 2025-01-11

## 2025-01-11 RX ORDER — KETOROLAC TROMETHAMINE 15 MG/ML
15 INJECTION, SOLUTION INTRAMUSCULAR; INTRAVENOUS ONCE
Status: COMPLETED | OUTPATIENT
Start: 2025-01-11 | End: 2025-01-11

## 2025-01-11 RX ADMIN — DEXAMETHASONE SODIUM PHOSPHATE 10 MG: 10 INJECTION INTRAMUSCULAR; INTRAVENOUS at 14:04

## 2025-01-11 RX ADMIN — KETOROLAC TROMETHAMINE 15 MG: 15 INJECTION, SOLUTION INTRAMUSCULAR; INTRAVENOUS at 14:04

## 2025-01-11 NOTE — ED TRIAGE NOTES
"Pt arrives amb to triage with c/o sore throat and swollen neck. Per pt it \"hurts to swallow and I feel like I am constantly spitting\". Sx has been going on since Wednesday. Has  taken tylenol and one other medication she cannot remember the name of. Sara  used during triage      Triage Assessment (Adult)       Row Name 01/11/25 1228          Triage Assessment    Airway WDL WDL        Respiratory WDL    Respiratory WDL WDL        Skin Circulation/Temperature WDL    Skin Circulation/Temperature WDL WDL        Cardiac WDL    Cardiac WDL WDL        Peripheral/Neurovascular WDL    Peripheral Neurovascular WDL WDL        Cognitive/Neuro/Behavioral WDL    Cognitive/Neuro/Behavioral WDL WDL                     "

## 2025-01-11 NOTE — DISCHARGE INSTRUCTIONS
Rest and drink plenty of fluids.  You may take ibuprofen and/or Tylenol as needed for pain - see dosing information below.  You may take Tessalon for cough and over-the-counter Mucinex and Flonase nasal spray for congestion.    You may use over-the-counter Cepacol lozenges, Throat Coat tea, tea with honey, salt water gargles, as needed for sore throat.     Tylenol (Acetaminophen) Discharge Instructions:  You may take 2 tablets of regular strength, over-the-counter, Tylenol (acetaminophen) every 4-6 hours as needed for pain.  Take no more than 4000 mg of Tylenol in a 24-hour period.      Avoid taking more than 1 acetaminophen-containing product at a time and be aware that many over-the-counter medications contain a combination of acetaminophen and other products.  If you are taking Tylenol in addition to a combination product please keep track of your daily acetaminophen dose to make sure you do not exceed the recommended 4000 mg.  Taking too much acetaminophen can cause permanent damage to your liver.    Ibuprofen/Naproxen Discharge Instructions:  You may take ibuprofen for pain control.  The maximum dose of (ibuprofen is 3200 mg ) in a 24-hour period.    Take this medication with food to prevent stomach irritation.  With long-term use this medication can irritate the stomach causing pain and lead to development of a stomach ulcer.  If you notice stomach pain or vomiting of coffee-ground colored vomit or blood, please be seen by a healthcare provider.  Attempt to use this medication for the shortest time possible.      Follow-up with your primary care provider for reevaluation, especially if symptoms persist.    Return to the emergency department for any new or worsening symptoms including uncontrollable fever/chills, neck stiffness, neck swelling, increased swelling/pain on one side of your neck, chest pain, shortness of breath, coughing up blood, or any other concerning symptoms.    Take Care!  - Ninfa Ambrocio  [email or web address]

## 2025-01-11 NOTE — ED PROVIDER NOTES
EMERGENCY DEPARTMENT ENCOUNTER      NAME: Param Polanco  AGE: 25 year old female  YOB: 2000  MRN: [id number]  EVALUATION DATE & TIME: No admission date for patient encounter.    PCP: BLANK [facility]ang [place]    ED PROVIDER: Ninfa Ambrocio PA-C      Chief Complaint   Patient presents with    Pharyngitis         FINAL IMPRESSION:  1. Acute viral tonsillitis          ED COURSE & MEDICAL DECISION MAKIN:28 PM Met with patient for initial interview. Plan for care discussed.  3:40 PM Reevaluated and updated patient. I discussed the plan for discharge with the patient, and patient is agreeable. We discussed supportive cares at home and reasons for return to the ER including new or worsening symptoms. All questions and concerns addressed. Patient to be discharged by RN.    25 year old female presents to the Emergency Department for evaluation of sore throat over the last 3 days. Upon exam, patient is afebrile and in no acute distress. 2-3+ tonsillar enlargement without exudate. Uvula midline without deviation. No evidence of tonsillar or peritonsillar abscess. Trachea midline with normal phonation. No tripoding or drooling. Tolerates secretions. No nuchal rigidity. Differential diagnosis includes but not limited to strep, COVID, influenza, RSV, pneumonia, other viral tonsillitis/URI. Based on patient's presenting symptoms, laboratories and imaging were ordered. COVID/influenza/RSV negative. CXR negative.    Patient was treated with Toradol and Decadron upon arrival with significant subjective improvement in symptoms. Symptoms and workup most consistent with likely viral tonsillitis. Patient was made aware of the above findings. Plan to discharge patient home with prescription tessalon and viscous lidocaine, strict return precautions, and close follow up with their PCP for reevaluation and ongoing management. The patient was advised to return to the ER if any new or worsening symptoms develop. The  patient verbalizes understanding and agrees with the plan.     Medical Decision Making  Obtained supplemental history:Supplemental history obtained?: No  Reviewed external records: External records reviewed?: No  Care impacted by chronic illness:Documented in Chart  Did you consider but not order tests?: Work up considered but not performed and documented in chart, if applicable  Did you interpret images independently?: Independent interpretation of ECG and images noted in documentation, when applicable.  Consultation discussion with other provider:Did you involve another provider (consultant, , pharmacy, etc.)?: No  Discharge. I prescribed additional prescription strength medication(s) as charted. See documentation for any additional details.    MIPS: Not Applicable      MEDICATIONS GIVEN IN THE EMERGENCY:  Medications   ketorolac (TORADOL) injection 15 mg (15 mg Intramuscular $Given 1/11/25 1404)   dexAMETHasone PF (DECADRON) injection 10 mg (10 mg Intramuscular $Given 1/11/25 1404)       NEW PRESCRIPTIONS STARTED AT TODAY'S ER VISIT  New Prescriptions    BENZONATATE (TESSALON) 100 MG CAPSULE    Take 1 capsule (100 mg) by mouth 3 times daily as needed for cough.    LIDOCAINE, VISCOUS, (XYLOCAINE) 2 % SOLUTION    Swish and spit 15 mLs in mouth every 4 hours as needed for pain. ; Max 8 doses/24 hour period.          =================================================================    HPI    Patient information was obtained from: Patient     Use of : Yes (Phone) - Language Sara Polanco is a 25 year old female with a pertinent history of major depressive disorder, who presents to this ED for evaluation of sore throat.    The patient reports sore throat (L>R), productive cough, and fever for 3 days. She has not been able to eat solid food for the past few days due to the pain. Patient does smoke tobacco. She has used Tylenol for pain relief.     No recent travels.       REVIEW OF SYSTEMS   Review  of Systems See HPI    PAST MEDICAL HISTORY:  No past medical history on file.    PAST SURGICAL HISTORY:  No past surgical history on file.        CURRENT MEDICATIONS:    benzonatate (TESSALON) 100 MG capsule  lidocaine, viscous, (XYLOCAINE) 2 % solution  clindamycin (CLINDAGEL) 1 % external gel  polyethylene glycol (MIRALAX) 17 GM/Dose powder        ALLERGIES:  No Known Allergies    FAMILY HISTORY:  No family history on file.    SOCIAL HISTORY:   Social History     Socioeconomic History    Marital status: Single   Tobacco Use    Smoking status: Never     Passive exposure: Current    Smokeless tobacco: Never   Vaping Use    Vaping status: Every Day    Substances: Nicotine, Flavoring    Devices: Disposable, Pre-filled pod   Substance and Sexual Activity    Alcohol use: No    Drug use: No    Sexual activity: Yes     Partners: Male     Birth control/protection: None     Social Drivers of Health     Financial Resource Strain: Low Risk  (1/2/2024)    Financial Resource Strain     Within the past 12 months, have you or your family members you live with been unable to get utilities (heat, electricity) when it was really needed?: No   Food Insecurity: High Risk (1/2/2024)    Food Insecurity     Within the past 12 months, did you worry that your food would run out before you got money to buy more?: Yes     Within the past 12 months, did the food you bought just not last and you didn t have money to get more?: Patient declined   Transportation Needs: Low Risk  (1/2/2024)    Transportation Needs     Within the past 12 months, has lack of transportation kept you from medical appointments, getting your medicines, non-medical meetings or appointments, work, or from getting things that you need?: No   Physical Activity: Insufficiently Active (11/2/2021)    Exercise Vital Sign     Days of Exercise per Week: 1 day     Minutes of Exercise per Session: 10 min   Stress: Stress Concern Present (2/14/2022)    [place] [employer]  Occupational Health - Occupational Stress Questionnaire     Feeling of Stress : To some extent   Social Connections: Moderately Isolated (2/14/2022)    Social Connection and Isolation Panel [NHANES]     Frequency of Communication with Friends and Family: More than three times a week     Frequency of Social Gatherings with Friends and Family: More than three times a week     Attends [employer] Services: Never     Active Member of Clubs or Organizations: No     Attends Club or Organization Meetings: Never     Marital Status: Living with partner   Interpersonal Safety: Low Risk  (1/2/2024)    Interpersonal Safety     Do you feel physically and emotionally safe where you currently live?: Yes     Within the past 12 months, have you been hit, slapped, kicked or otherwise physically hurt by someone?: No     Within the past 12 months, have you been humiliated or emotionally abused in other ways by your partner or ex-partner?: No   Housing Stability: High Risk (1/2/2024)    Housing Stability     Do you have housing? : Yes     Are you worried about losing your housing?: Yes       VITALS:  /85   Pulse 79   Temp 98.7  F (37.1  C) (Oral)   Resp 16   Ht 1.524 m (5')   Wt 54.1 kg (119 lb 3.2 oz)   SpO2 97%   BMI 23.28 kg/m      PHYSICAL EXAM    Constitutional:  Alert, in no acute distress. Cooperative.  EYES: Conjunctivae clear.   HENT:  Atraumatic, normocephalic. Normal nose. Oropharynx without erythema, swelling, or exudates. Tonsils 2-3+ and symmetrical. Uvula midline without edema. No evidence of tonsillar or peritonsillar abscess. Moist membranes. No submandibular swelling. No trismus. No buccal edema or erythema. Tolerates secretions. No nuchal rigidity. Full ROM neck without pain. Mild tender submandibular palpable cervical lymphadenopathy. Trachea midline with normal phonation.   Respiratory:  Respirations even, unlabored, in no acute respiratory distress. Lungs clear to auscultation bilaterally without wheeze,  rhonchi, or rales. Rare dry cough. Speaks in full sentences easily.  Cardiovascular:  Regular rate and rhythm, good peripheral perfusion. No peripheral edema. No chest wall tenderness.  GI: Soft, flat, non-distended.   Musculoskeletal:  No edema. No cyanosis. Range of motion major extremities intact.    Integument: Warm, Dry. No rash to visualized skin.  Neurologic:  Alert & oriented. No focal deficits noted.  Psych: Normal mood and affect.     LAB:  All pertinent labs reviewed and interpreted.  Results for orders placed or performed during the hospital encounter of 01/11/25   Chest XR,  PA & LAT    Impression    IMPRESSION: Negative chest.   Influenza A/B, RSV and SARS-CoV2 PCR (COVID-19) Nasopharyngeal    Specimen: Nasopharyngeal; Swab   Result Value Ref Range    Influenza A PCR Negative Negative    Influenza B PCR Negative Negative    RSV PCR Negative Negative    SARS CoV2 PCR Negative Negative   Group A Streptococcus PCR Throat Swab    Specimen: Throat; Swab   Result Value Ref Range    Group A strep by PCR Not Detected Not Detected       RADIOLOGY:  Reviewed all pertinent imaging. Please see official radiology report.  Chest XR,  PA & LAT   Final Result   IMPRESSION: Negative chest.          EKG:    None.     PROCEDURES:   None.     I, Talia Fernandez, am serving as a scribe to document services personally performed by Ninfa Ambrocio PA-C based on my observation and the provider's statements to me. I, Ninfa Ambrocio PA-C, attest that Talia Fernandez is acting in a scribe capacity, has observed my performance of the services and has documented them in accordance with my direction.    Ninfa Ambrocio PA-C  [facility] EMERGENCY DEPARTMENT  [address] [place] [id number]  [phone]      Ninfa Ambrocio PA-C  01/11/25 5020

## 2025-03-19 NOTE — ED TRIAGE NOTES
-Please contact me via patient portal or call my office for any concerns.  -Please don't  drive or operate heavy machinery while feeling drowsy, use precautionary measures like pulling over, taking a brief nap when driving or taking break to take a nap while working.    - TRY TO WALK 30 MINUTES/DAY. CAN DO IN BRIEF INTERVALS, DOESN'T HAVE TO BE IN A SINGLE STRETCH.     Source: Healthy Eating Plate; [email or web address]    Building a Healthy and Balanced Diet  Make most of your meal vegetables and fruits - ½ of your plate.  Aim for color and variety, and remember that potatoes don’t count as vegetables on the Healthy Eating Plate because of their negative impact on blood sugar.    Go for whole grains - ¼ of your plate.  Whole and intact grains--whole wheat, barley, wheat berries, quinoa, oats, brown rice, and foods made with them, such as whole wheat pasta--have a milder effect on blood sugar and insulin than white bread, white rice, and other refined grains.    Protein power - ¼ of your plate.  Fish, poultry, beans, and nuts are all healthy, versatile protein sources--they can be mixed into salads, and pair well with vegetables on a plate. Limit red meat, and avoid processed meats such as cadena and sausage.    Healthy plant oils - in moderation.  Choose healthy vegetable oils like olive, canola, soy, corn, sunflower, peanut, and others, and avoid partially hydrogenated oils, which contain unhealthy trans fats. Remember that low-fat does not mean “healthy.”    Drink water, coffee, or tea.  Skip sugary drinks, limit milk and dairy products to one to two servings per day, and limit juice to a small glass per day.    Stay active.  The red figure running across the Healthy Eating Plate’s placemat is a reminder that staying active is also important in weight control.    The main message of the Healthy Eating Plate is to focus on diet quality:    The type of carbohydrate in the  Pt reports for past month has been having bleeding with stools, was told by her PCP that she needed to come in.  Pt reports there is no pain but the bleeding has been increasing for the past 2 weeks.      Triage Assessment (Adult)       Row Name 07/17/24 5679          Triage Assessment    Airway WDL WDL        Respiratory WDL    Respiratory WDL WDL        Skin Circulation/Temperature WDL    Skin Circulation/Temperature WDL WDL        Cardiac WDL    Cardiac WDL WDL        Peripheral/Neurovascular WDL    Peripheral Neurovascular WDL WDL        Cognitive/Neuro/Behavioral WDL    Cognitive/Neuro/Behavioral WDL WDL                      diet is more important than the amount of carbohydrate in the diet, because some sources of carbohydrate--like vegetables (other than potatoes), fruits, whole grains, and beans--are healthier than others.  The Healthy Eating Plate also advises consumers to avoid sugary beverages, a major source of calories--usually with little nutritional value--in the American diet.  The Healthy Eating Plate encourages consumers to use healthy oils, and it does not set a maximum on the percentage of calories people should get each day from healthy sources of fat. In this way, the Healthy Eating Plate recommends the opposite of the low-fat message promoted for decades by the USDA.

## 2025-03-24 ENCOUNTER — TELEPHONE (OUTPATIENT)
Dept: FAMILY MEDICINE | Facility: CLINIC | Age: 25
End: 2025-03-24
Payer: COMMERCIAL

## 2025-03-24 NOTE — TELEPHONE ENCOUNTER
Patient Quality Outreach    Patient is due for the following:   Depression  -  PHQ-9 needed and Depression follow-up visit  Physical Preventive Adult Physical      Topic Date Due    Meningitis B Vaccine (2 of 2 - Bexsero SCDM 2-dose series) 06/03/2020    Pneumococcal Vaccine (1 of 2 - PCV) Never done    Flu Vaccine (1) 09/01/2024    COVID-19 Vaccine (3 - 2024-25 season) 09/01/2024       Action(s) Taken:   Schedule a Adult Preventative    Type of outreach:    Phone, spoke to patient/parent. Patient schedule physical 5/2025    Questions for provider review:    None         Michelle Tinoco MA  Chart routed to n/a.

## 2025-03-27 ENCOUNTER — OFFICE VISIT (OUTPATIENT)
Dept: FAMILY MEDICINE | Facility: CLINIC | Age: 25
End: 2025-03-27
Payer: COMMERCIAL

## 2025-03-27 VITALS
SYSTOLIC BLOOD PRESSURE: 115 MMHG | OXYGEN SATURATION: 97 % | HEIGHT: 60 IN | BODY MASS INDEX: 22.04 KG/M2 | TEMPERATURE: 98 F | HEART RATE: 101 BPM | RESPIRATION RATE: 12 BRPM | DIASTOLIC BLOOD PRESSURE: 82 MMHG | WEIGHT: 112.25 LBS

## 2025-03-27 DIAGNOSIS — Z30.9 ENCOUNTER FOR CONTRACEPTIVE MANAGEMENT, UNSPECIFIED TYPE: ICD-10-CM

## 2025-03-27 DIAGNOSIS — R30.0 DYSURIA: Primary | ICD-10-CM

## 2025-03-27 DIAGNOSIS — F33.1 MODERATE EPISODE OF RECURRENT MAJOR DEPRESSIVE DISORDER (H): ICD-10-CM

## 2025-03-27 LAB
ALBUMIN UR-MCNC: NEGATIVE MG/DL
APPEARANCE UR: CLEAR
BILIRUB UR QL STRIP: NEGATIVE
COLOR UR AUTO: YELLOW
GLUCOSE UR STRIP-MCNC: NEGATIVE MG/DL
HCG UR QL: NEGATIVE
HGB UR QL STRIP: NEGATIVE
KETONES UR STRIP-MCNC: NEGATIVE MG/DL
LEUKOCYTE ESTERASE UR QL STRIP: NEGATIVE
NITRATE UR QL: NEGATIVE
PH UR STRIP: 6 [PH] (ref 5–7)
SP GR UR STRIP: <=1.005 (ref 1–1.03)
UROBILINOGEN UR STRIP-ACNC: 0.2 E.U./DL

## 2025-03-27 RX ORDER — MEDROXYPROGESTERONE ACETATE 150 MG/ML
150 INJECTION, SUSPENSION INTRAMUSCULAR
Status: ACTIVE | OUTPATIENT
Start: 2025-03-27 | End: 2026-03-22

## 2025-03-27 RX ADMIN — MEDROXYPROGESTERONE ACETATE 150 MG: 150 INJECTION, SUSPENSION INTRAMUSCULAR at 10:07

## 2025-03-27 ASSESSMENT — PATIENT HEALTH QUESTIONNAIRE - PHQ9
10. IF YOU CHECKED OFF ANY PROBLEMS, HOW DIFFICULT HAVE THESE PROBLEMS MADE IT FOR YOU TO DO YOUR WORK, TAKE CARE OF THINGS AT HOME, OR GET ALONG WITH OTHER PEOPLE: NOT DIFFICULT AT ALL
SUM OF ALL RESPONSES TO PHQ QUESTIONS 1-9: 12
SUM OF ALL RESPONSES TO PHQ QUESTIONS 1-9: 12

## 2025-03-27 NOTE — PROGRESS NOTES
Dysuria  Normal UA.  - UA Macroscopic with reflex to Microscopic and Culture - Clinic Collect    Encounter for contraceptive management, unspecified type  Negative UPT.  Depo-Provera injection today.  - HCG qualitative urine; Future  - HCG qualitative urine  - medroxyPROGESTERone (DEPO-PROVERA) injection 150 mg    Moderate episode of recurrent major depressive disorder (H)  Doing well without medication currently.    Subjective   Smu is a 25 year old, presenting for the following health issues:  Complaining of dysuria for about 2 days.  No fever.  No close blood in the urine.  She is also requesting Depo-Provera injection.   She has history of depression but doing well without medication currently.  Denies suicidal or homicidal ideations.        3/27/2025     9:31 AM   Additional Questions   Roomed by BRUNA Herrera   Accompanied by self       Review of Systems  CONSTITUTIONAL: NEGATIVE for fever, chills, change in weight  ENT/MOUTH: NEGATIVE for ear, mouth and throat problems  RESP: NEGATIVE for significant cough or SOB  CV: NEGATIVE for chest pain/chest pressure      Objective    /82 (BP Location: Right arm, Patient Position: Sitting, Cuff Size: Adult Regular)   Pulse 101   Temp 98  F (36.7  C) (Oral)   Resp 12   Ht 1.524 m (5')   Wt 50.9 kg (112 lb 4 oz)   LMP 03/03/2025 (Within Days)   SpO2 97%   BMI 21.92 kg/m    Body mass index is 21.92 kg/m .  Physical Exam   GENERAL: alert and no distress  NECK: Supple  RESP: lungs clear to auscultation - no rales, rhonchi or wheezes  CV: regular rates and rhythm and no murmur, click or rub  ABDOMEN: soft, nontender  PSYCH: mentation appears normal    This transcription uses voice recognition software, which may contain typographical errors.    The longitudinal plan of care for the diagnosis(es)/condition(s) as documented were addressed during this visit. Due to the added complexity in care, I will continue to support Smu in the subsequent management and  with ongoing continuity of care.          Signed Electronically by: Adrian Rader MD